# Patient Record
Sex: MALE | Race: BLACK OR AFRICAN AMERICAN | Employment: UNEMPLOYED | URBAN - METROPOLITAN AREA
[De-identification: names, ages, dates, MRNs, and addresses within clinical notes are randomized per-mention and may not be internally consistent; named-entity substitution may affect disease eponyms.]

---

## 2017-05-26 ENCOUNTER — HOSPITAL ENCOUNTER (OUTPATIENT)
Dept: LAB | Age: 47
Discharge: HOME | End: 2017-05-26
Payer: MEDICARE

## 2017-05-26 DIAGNOSIS — E03.9 UNSPECIFIED HYPOTHYROIDISM: ICD-10-CM

## 2017-05-26 DIAGNOSIS — E78.5 OTHER AND UNSPECIFIED HYPERLIPIDEMIA: ICD-10-CM

## 2017-05-26 DIAGNOSIS — G80.9: ICD-10-CM

## 2017-05-26 DIAGNOSIS — B35.3 DERMATOPHYTOSIS OF FOOT: ICD-10-CM

## 2017-05-26 LAB
25(OH)D3 SERPL-MCNC: 22.9 NG/ML (ref 30–100)
ALBUMIN SERPL BCP-MCNC: 4.1 G/DL (ref 3.4–5)
ALBUMIN/GLOB SERPL: 1.2 {RATIO} (ref 0.8–1.7)
ALP SERPL-CCNC: 72 U/L (ref 45–117)
ALT SERPL-CCNC: 10 U/L (ref 16–61)
ANION GAP BLD CALC-SCNC: 8 MMOL/L (ref 3–18)
AST SERPL W P-5'-P-CCNC: 15 U/L (ref 15–37)
BASOPHILS # BLD AUTO: 0 K/UL (ref 0–0.06)
BASOPHILS # BLD: 0 % (ref 0–2)
BILIRUB DIRECT SERPL-MCNC: 0.1 MG/DL (ref 0–0.2)
BILIRUB SERPL-MCNC: 0.3 MG/DL (ref 0.2–1)
BUN SERPL-MCNC: 11 MG/DL (ref 7–18)
BUN/CREAT SERPL: 14 (ref 12–20)
CALCIUM SERPL-MCNC: 9 MG/DL (ref 8.5–10.1)
CHLORIDE SERPL-SCNC: 102 MMOL/L (ref 100–108)
CHOLEST SERPL-MCNC: 223 MG/DL
CO2 SERPL-SCNC: 27 MMOL/L (ref 21–32)
CREAT SERPL-MCNC: 0.79 MG/DL (ref 0.6–1.3)
DIFFERENTIAL METHOD BLD: ABNORMAL
EOSINOPHIL # BLD: 0 K/UL (ref 0–0.4)
EOSINOPHIL NFR BLD: 1 % (ref 0–5)
ERYTHROCYTE [DISTWIDTH] IN BLOOD BY AUTOMATED COUNT: 13.2 % (ref 11.6–14.5)
GLOBULIN SER CALC-MCNC: 3.5 G/DL (ref 2–4)
GLUCOSE SERPL-MCNC: 100 MG/DL (ref 74–99)
HCT VFR BLD AUTO: 46.5 % (ref 36–48)
HDLC SERPL-MCNC: 68 MG/DL (ref 40–60)
HDLC SERPL: 3.3 {RATIO} (ref 0–5)
HGB BLD-MCNC: 15.8 G/DL (ref 13–16)
LDLC SERPL CALC-MCNC: 130.4 MG/DL (ref 0–100)
LIPID PROFILE,FLP: ABNORMAL
LYMPHOCYTES # BLD AUTO: 55 % (ref 21–52)
LYMPHOCYTES # BLD: 1.9 K/UL (ref 0.9–3.6)
MCH RBC QN AUTO: 28.9 PG (ref 24–34)
MCHC RBC AUTO-ENTMCNC: 34 G/DL (ref 31–37)
MCV RBC AUTO: 85.2 FL (ref 74–97)
MONOCYTES # BLD: 0.2 K/UL (ref 0.05–1.2)
MONOCYTES NFR BLD AUTO: 7 % (ref 3–10)
NEUTS SEG # BLD: 1.2 K/UL (ref 1.8–8)
NEUTS SEG NFR BLD AUTO: 37 % (ref 40–73)
PLATELET # BLD AUTO: 105 K/UL (ref 135–420)
PMV BLD AUTO: 11.2 FL (ref 9.2–11.8)
POTASSIUM SERPL-SCNC: 4.2 MMOL/L (ref 3.5–5.5)
PROT SERPL-MCNC: 7.6 G/DL (ref 6.4–8.2)
PSA SERPL-MCNC: 0.4 NG/ML (ref 0–4)
RBC # BLD AUTO: 5.46 M/UL (ref 4.7–5.5)
SODIUM SERPL-SCNC: 137 MMOL/L (ref 136–145)
TRIGL SERPL-MCNC: 123 MG/DL (ref ?–150)
TSH SERPL DL<=0.05 MIU/L-ACNC: 0.66 UIU/ML (ref 0.36–3.74)
VLDLC SERPL CALC-MCNC: 24.6 MG/DL
WBC # BLD AUTO: 3.4 K/UL (ref 4.6–13.2)

## 2017-05-26 PROCEDURE — 36415 COLL VENOUS BLD VENIPUNCTURE: CPT | Performed by: FAMILY MEDICINE

## 2017-05-26 PROCEDURE — 80061 LIPID PANEL: CPT | Performed by: FAMILY MEDICINE

## 2017-05-26 PROCEDURE — 80048 BASIC METABOLIC PNL TOTAL CA: CPT | Performed by: FAMILY MEDICINE

## 2017-05-26 PROCEDURE — 80076 HEPATIC FUNCTION PANEL: CPT | Performed by: FAMILY MEDICINE

## 2017-05-26 PROCEDURE — 82306 VITAMIN D 25 HYDROXY: CPT | Performed by: FAMILY MEDICINE

## 2017-05-26 PROCEDURE — 84153 ASSAY OF PSA TOTAL: CPT | Performed by: FAMILY MEDICINE

## 2017-05-26 PROCEDURE — 85025 COMPLETE CBC W/AUTO DIFF WBC: CPT | Performed by: FAMILY MEDICINE

## 2017-05-26 PROCEDURE — 84443 ASSAY THYROID STIM HORMONE: CPT | Performed by: FAMILY MEDICINE

## 2017-05-31 ENCOUNTER — HOSPITAL ENCOUNTER (OUTPATIENT)
Dept: LAB | Age: 47
Discharge: HOME | End: 2017-05-31
Payer: MEDICARE

## 2017-05-31 LAB
APPEARANCE UR: CLEAR
BILIRUB UR QL: NEGATIVE
COLOR UR: ABNORMAL
GLUCOSE UR STRIP.AUTO-MCNC: NEGATIVE MG/DL
HGB UR QL STRIP: NEGATIVE
KETONES UR QL STRIP.AUTO: ABNORMAL MG/DL
LEUKOCYTE ESTERASE UR QL STRIP.AUTO: NEGATIVE
NITRITE UR QL STRIP.AUTO: NEGATIVE
PH UR STRIP: 6.5 [PH] (ref 5–8)
PROT UR STRIP-MCNC: NEGATIVE MG/DL
SP GR UR REFRACTOMETRY: 1.03 (ref 1–1.03)
UROBILINOGEN UR QL STRIP.AUTO: 1 EU/DL (ref 0.2–1)

## 2017-05-31 PROCEDURE — 81003 URINALYSIS AUTO W/O SCOPE: CPT | Performed by: FAMILY MEDICINE

## 2017-11-14 ENCOUNTER — HOSPITAL ENCOUNTER (OUTPATIENT)
Dept: LAB | Age: 47
Discharge: HOME | End: 2017-11-14
Payer: MEDICARE

## 2017-11-14 DIAGNOSIS — G40.019 LOCALIZATION-RELATED IDIOPATHIC EPILEPSY AND EPILEPTIC SYNDROMES WITH SEIZURES OF LOCALIZED ONSET, INTRACTABLE, WITHOUT STATUS EPILEPTICUS (HCC): ICD-10-CM

## 2017-11-14 LAB
ALBUMIN SERPL-MCNC: 4.1 G/DL (ref 3.4–5)
ALBUMIN/GLOB SERPL: 1.2 {RATIO} (ref 0.8–1.7)
ALP SERPL-CCNC: 66 U/L (ref 45–117)
ALT SERPL-CCNC: 14 U/L (ref 16–61)
APTT PPP: 34 SEC (ref 23–36.4)
AST SERPL-CCNC: 11 U/L (ref 15–37)
BILIRUB DIRECT SERPL-MCNC: <0.1 MG/DL (ref 0–0.2)
BILIRUB SERPL-MCNC: 0.2 MG/DL (ref 0.2–1)
GLOBULIN SER CALC-MCNC: 3.5 G/DL (ref 2–4)
INR PPP: 1 (ref 0.8–1.2)
PROT SERPL-MCNC: 7.6 G/DL (ref 6.4–8.2)
PROTHROMBIN TIME: 12.9 SEC (ref 11.5–15.2)

## 2017-11-14 PROCEDURE — 36415 COLL VENOUS BLD VENIPUNCTURE: CPT | Performed by: PSYCHIATRY & NEUROLOGY

## 2017-11-14 PROCEDURE — 80076 HEPATIC FUNCTION PANEL: CPT | Performed by: PSYCHIATRY & NEUROLOGY

## 2017-11-14 PROCEDURE — 85610 PROTHROMBIN TIME: CPT | Performed by: PSYCHIATRY & NEUROLOGY

## 2017-11-14 PROCEDURE — 85730 THROMBOPLASTIN TIME PARTIAL: CPT | Performed by: PSYCHIATRY & NEUROLOGY

## 2017-11-20 ENCOUNTER — HOSPITAL ENCOUNTER (OUTPATIENT)
Dept: LAB | Age: 47
Discharge: HOME | End: 2017-11-20

## 2017-11-20 DIAGNOSIS — E78.5 HYPERLIPEMIA: ICD-10-CM

## 2017-11-20 DIAGNOSIS — D72.819 LEUCOPENIA: ICD-10-CM

## 2017-11-20 DIAGNOSIS — H91.90 PROBLEMS WITH HEARING: ICD-10-CM

## 2017-11-20 DIAGNOSIS — K06.1 GINGIVAL HYPERPLASIA: ICD-10-CM

## 2017-11-20 DIAGNOSIS — E03.9 MYXEDEMA HEART DISEASE: ICD-10-CM

## 2017-11-20 DIAGNOSIS — F80.9 PROBLEMS WITH COMMUNICATION (INCLUDING SPEECH): ICD-10-CM

## 2017-11-20 DIAGNOSIS — H53.461 RIGHT HOMONYMOUS HEMIANOPSIA: ICD-10-CM

## 2017-11-20 DIAGNOSIS — E79.0 URICACIDEMIA: ICD-10-CM

## 2017-11-20 DIAGNOSIS — I51.9 MYXEDEMA HEART DISEASE: ICD-10-CM

## 2017-11-20 DIAGNOSIS — G83.89 CEREBRAL PARESIS WITH HOMOLATERAL ATAXIA (HCC): ICD-10-CM

## 2017-11-20 DIAGNOSIS — D69.6 ACQUIRED THROMBOCYTOPENIA (HCC): ICD-10-CM

## 2017-11-20 DIAGNOSIS — G40.909 EPILEPSY (HCC): ICD-10-CM

## 2017-11-20 DIAGNOSIS — R26.9 ABNORMALITY OF GAIT: ICD-10-CM

## 2017-11-20 DIAGNOSIS — B35.3 DERMATOPHYTOSIS OF FOOT: ICD-10-CM

## 2017-11-20 DIAGNOSIS — E55.9 AVITAMINOSIS D: ICD-10-CM

## 2018-07-11 ENCOUNTER — APPOINTMENT (OUTPATIENT)
Dept: CT IMAGING | Age: 48
End: 2018-07-11
Attending: STUDENT IN AN ORGANIZED HEALTH CARE EDUCATION/TRAINING PROGRAM
Payer: MEDICARE

## 2018-07-11 ENCOUNTER — HOSPITAL ENCOUNTER (EMERGENCY)
Age: 48
Discharge: HOME | End: 2018-07-11
Attending: EMERGENCY MEDICINE | Admitting: EMERGENCY MEDICINE
Payer: MEDICARE

## 2018-07-11 VITALS
SYSTOLIC BLOOD PRESSURE: 111 MMHG | HEART RATE: 61 BPM | RESPIRATION RATE: 17 BRPM | DIASTOLIC BLOOD PRESSURE: 73 MMHG | BODY MASS INDEX: 29.73 KG/M2 | OXYGEN SATURATION: 97 % | HEIGHT: 66 IN | TEMPERATURE: 97.6 F | WEIGHT: 185 LBS

## 2018-07-11 DIAGNOSIS — E03.9 HYPOTHYROIDISM, UNSPECIFIED TYPE: ICD-10-CM

## 2018-07-11 DIAGNOSIS — G40.909 SEIZURE DISORDER (HCC): Primary | ICD-10-CM

## 2018-07-11 LAB
ANION GAP SERPL CALC-SCNC: 6 MMOL/L (ref 3–18)
APPEARANCE UR: CLEAR
ATRIAL RATE: 66 BPM
BASOPHILS # BLD: 0 K/UL (ref 0–0.1)
BASOPHILS NFR BLD: 0 % (ref 0–2)
BILIRUB UR QL: NEGATIVE
BUN SERPL-MCNC: 18 MG/DL (ref 7–18)
BUN/CREAT SERPL: 20 (ref 12–20)
CALCIUM SERPL-MCNC: 8.6 MG/DL (ref 8.5–10.1)
CALCULATED P AXIS, ECG09: 61 DEGREES
CALCULATED R AXIS, ECG10: 34 DEGREES
CALCULATED T AXIS, ECG11: 24 DEGREES
CHLORIDE SERPL-SCNC: 111 MMOL/L (ref 100–108)
CO2 SERPL-SCNC: 28 MMOL/L (ref 21–32)
COLOR UR: YELLOW
CREAT SERPL-MCNC: 0.9 MG/DL (ref 0.6–1.3)
DIAGNOSIS, 93000: NORMAL
DIFFERENTIAL METHOD BLD: ABNORMAL
EOSINOPHIL # BLD: 0.1 K/UL (ref 0–0.4)
EOSINOPHIL NFR BLD: 1 % (ref 0–5)
ERYTHROCYTE [DISTWIDTH] IN BLOOD BY AUTOMATED COUNT: 13.8 % (ref 11.6–14.5)
GLUCOSE BLD STRIP.AUTO-MCNC: 106 MG/DL (ref 70–110)
GLUCOSE SERPL-MCNC: 103 MG/DL (ref 74–99)
GLUCOSE UR STRIP.AUTO-MCNC: NEGATIVE MG/DL
HCT VFR BLD AUTO: 42.8 % (ref 36–48)
HGB BLD-MCNC: 14.4 G/DL (ref 13–16)
HGB UR QL STRIP: NEGATIVE
KETONES UR QL STRIP.AUTO: ABNORMAL MG/DL
LEUKOCYTE ESTERASE UR QL STRIP.AUTO: NEGATIVE
LYMPHOCYTES # BLD: 2.5 K/UL (ref 0.9–3.6)
LYMPHOCYTES NFR BLD: 59 % (ref 21–52)
MCH RBC QN AUTO: 29.5 PG (ref 24–34)
MCHC RBC AUTO-ENTMCNC: 33.6 G/DL (ref 31–37)
MCV RBC AUTO: 87.7 FL (ref 74–97)
MONOCYTES # BLD: 0.4 K/UL (ref 0.05–1.2)
MONOCYTES NFR BLD: 9 % (ref 3–10)
NEUTS SEG # BLD: 1.3 K/UL (ref 1.8–8)
NEUTS SEG NFR BLD: 31 % (ref 40–73)
NITRITE UR QL STRIP.AUTO: NEGATIVE
P-R INTERVAL, ECG05: 158 MS
PH UR STRIP: 7 [PH] (ref 5–8)
PHENYTOIN SERPL-MCNC: 0.9 UG/ML (ref 10–20)
PLATELET # BLD AUTO: 114 K/UL (ref 135–420)
PMV BLD AUTO: 12.2 FL (ref 9.2–11.8)
POTASSIUM SERPL-SCNC: 4.1 MMOL/L (ref 3.5–5.5)
PROT UR STRIP-MCNC: NEGATIVE MG/DL
Q-T INTERVAL, ECG07: 356 MS
QRS DURATION, ECG06: 90 MS
QTC CALCULATION (BEZET), ECG08: 373 MS
RBC # BLD AUTO: 4.88 M/UL (ref 4.7–5.5)
SODIUM SERPL-SCNC: 145 MMOL/L (ref 136–145)
SP GR UR REFRACTOMETRY: >1.03 (ref 1–1.03)
T4 FREE SERPL-MCNC: 0.6 NG/DL (ref 0.7–1.5)
TSH SERPL DL<=0.05 MIU/L-ACNC: 0.29 UIU/ML (ref 0.36–3.74)
UROBILINOGEN UR QL STRIP.AUTO: 1 EU/DL (ref 0.2–1)
VALPROATE SERPL-MCNC: 84 UG/ML (ref 50–100)
VENTRICULAR RATE, ECG03: 66 BPM
WBC # BLD AUTO: 4.2 K/UL (ref 4.6–13.2)

## 2018-07-11 PROCEDURE — 99285 EMERGENCY DEPT VISIT HI MDM: CPT

## 2018-07-11 PROCEDURE — 85025 COMPLETE CBC W/AUTO DIFF WBC: CPT

## 2018-07-11 PROCEDURE — 81003 URINALYSIS AUTO W/O SCOPE: CPT

## 2018-07-11 PROCEDURE — 84443 ASSAY THYROID STIM HORMONE: CPT

## 2018-07-11 PROCEDURE — 82962 GLUCOSE BLOOD TEST: CPT

## 2018-07-11 PROCEDURE — 80048 BASIC METABOLIC PNL TOTAL CA: CPT

## 2018-07-11 PROCEDURE — 84439 ASSAY OF FREE THYROXINE: CPT

## 2018-07-11 PROCEDURE — 74011000258 HC RX REV CODE- 258: Performed by: STUDENT IN AN ORGANIZED HEALTH CARE EDUCATION/TRAINING PROGRAM

## 2018-07-11 PROCEDURE — 96365 THER/PROPH/DIAG IV INF INIT: CPT

## 2018-07-11 PROCEDURE — 70450 CT HEAD/BRAIN W/O DYE: CPT

## 2018-07-11 PROCEDURE — 80185 ASSAY OF PHENYTOIN TOTAL: CPT

## 2018-07-11 PROCEDURE — 93005 ELECTROCARDIOGRAM TRACING: CPT

## 2018-07-11 PROCEDURE — 74011250636 HC RX REV CODE- 250/636: Performed by: STUDENT IN AN ORGANIZED HEALTH CARE EDUCATION/TRAINING PROGRAM

## 2018-07-11 PROCEDURE — 80164 ASSAY DIPROPYLACETIC ACD TOT: CPT

## 2018-07-11 RX ORDER — NALOXONE HYDROCHLORIDE 0.4 MG/ML
0.4 INJECTION, SOLUTION INTRAMUSCULAR; INTRAVENOUS; SUBCUTANEOUS ONCE
Status: COMPLETED | OUTPATIENT
Start: 2018-07-11 | End: 2018-07-11

## 2018-07-11 RX ADMIN — NALOXONE HYDROCHLORIDE 0.4 MG: 0.4 INJECTION, SOLUTION INTRAMUSCULAR; INTRAVENOUS; SUBCUTANEOUS at 16:42

## 2018-07-11 RX ADMIN — PHENYTOIN SODIUM 600 MG: 50 INJECTION INTRAMUSCULAR; INTRAVENOUS at 19:45

## 2018-07-11 NOTE — ED TRIAGE NOTES
Per EMS, Patient is from a group home where he was sleeping on the couch where he was snoring less than normal. He does have pinpoint pupils.

## 2018-07-12 NOTE — ED NOTES
Pt resting on stretcher with care giver at bedside. Pt appears in a position of comfort with stable vital signs.

## 2018-07-12 NOTE — ED NOTES
I have reviewed discharge instructions with patient. The patient verbalized understanding. Patient armband removed and shredded. No acute distress noted at this time, pt alert and oriented. Stable at time of discharge. Vital signs stable. Caregiver to wheel pt to vehicle. Wheelchair provided.

## 2018-08-14 ENCOUNTER — HOSPITAL ENCOUNTER (OUTPATIENT)
Dept: NON INVASIVE DIAGNOSTICS | Age: 48
Discharge: HOME | End: 2018-08-14
Attending: FAMILY MEDICINE
Payer: MEDICARE

## 2018-08-14 VITALS
BODY MASS INDEX: 29.73 KG/M2 | DIASTOLIC BLOOD PRESSURE: 70 MMHG | HEIGHT: 66 IN | WEIGHT: 185 LBS | SYSTOLIC BLOOD PRESSURE: 110 MMHG

## 2018-08-14 DIAGNOSIS — M79.89 RIGHT LEG SWELLING: ICD-10-CM

## 2018-08-14 DIAGNOSIS — R03.0 ELEVATED BLOOD PRESSURE READING WITHOUT DIAGNOSIS OF HYPERTENSION: ICD-10-CM

## 2018-08-14 DIAGNOSIS — E78.5 HYPERLIPEMIA: ICD-10-CM

## 2018-08-14 DIAGNOSIS — G80.9 CEREBRAL PALSY, UNSPECIFIED TYPE (HCC): ICD-10-CM

## 2018-08-14 DIAGNOSIS — R26.9 GAIT DIFFICULTY: ICD-10-CM

## 2018-08-14 DIAGNOSIS — R06.02 SHORTNESS OF BREATH: ICD-10-CM

## 2018-08-14 DIAGNOSIS — G40.909 SEIZURE DISORDER (HCC): ICD-10-CM

## 2018-08-14 LAB
ECHO AO ROOT DIAM: 3.35 CM
ECHO LA AREA 4C: 18.4 CM2
ECHO LA VOL 2C: 41.68 ML (ref 18–58)
ECHO LA VOL 4C: 39.82 ML (ref 18–58)
ECHO LA VOL BP: 48.87 ML (ref 18–58)
ECHO LA VOL/BSA BIPLANE: 25.26 ML/M2
ECHO LA VOLUME INDEX A2C: 21.54 ML/M2
ECHO LA VOLUME INDEX A4C: 20.58 ML/M2
ECHO LV E' LATERAL VELOCITY: 8.86 CM/S
ECHO LV E' SEPTAL VELOCITY: 6.2 CM/S
ECHO LV INTERNAL DIMENSION DIASTOLIC: 3.77 CM (ref 4.2–5.9)
ECHO LV INTERNAL DIMENSION SYSTOLIC: 2.58 CM
ECHO LV IVSD: 1.06 CM (ref 0.6–1)
ECHO LV MASS 2D: 142.6 G (ref 88–224)
ECHO LV MASS INDEX 2D: 73.7 G/M2
ECHO LV POSTERIOR WALL DIASTOLIC: 1.05 CM (ref 0.6–1)
ECHO LVOT DIAM: 2.04 CM
ECHO MV A VELOCITY: 56.13 CM/S
ECHO MV E DECELERATION TIME (DT): 124.9 MS
ECHO MV E VELOCITY: 0.53 CM/S
ECHO MV E/A RATIO: 0.01
ECHO MV E/E' LATERAL: 0.06
ECHO MV E/E' RATIO (AVERAGED): 0.07
ECHO MV E/E' SEPTAL: 0.09
ECHO PVEIN A DURATION: 76.7 MS
ECHO PVEIN A VELOCITY: 22.99 CM/S
ECHO RV INTERNAL DIMENSION: 3.04 CM
ECHO TV REGURGITANT MAX VELOCITY: 227.65 CM/S
ECHO TV REGURGITANT PEAK GRADIENT: 20.7 MMHG

## 2018-08-14 PROCEDURE — 93306 TTE W/DOPPLER COMPLETE: CPT

## 2018-08-29 ENCOUNTER — HOSPITAL ENCOUNTER (OUTPATIENT)
Dept: VASCULAR SURGERY | Age: 48
Discharge: HOME | End: 2018-08-29
Attending: FAMILY MEDICINE
Payer: MEDICARE

## 2018-08-29 ENCOUNTER — HOSPITAL ENCOUNTER (EMERGENCY)
Age: 48
Discharge: HOME | End: 2018-08-29
Attending: EMERGENCY MEDICINE
Payer: MEDICARE

## 2018-08-29 ENCOUNTER — APPOINTMENT (OUTPATIENT)
Dept: GENERAL RADIOLOGY | Age: 48
End: 2018-08-29
Attending: EMERGENCY MEDICINE
Payer: MEDICARE

## 2018-08-29 VITALS
OXYGEN SATURATION: 97 % | TEMPERATURE: 97.8 F | RESPIRATION RATE: 18 BRPM | DIASTOLIC BLOOD PRESSURE: 72 MMHG | SYSTOLIC BLOOD PRESSURE: 122 MMHG | WEIGHT: 228 LBS | HEART RATE: 74 BPM | BODY MASS INDEX: 36.8 KG/M2

## 2018-08-29 DIAGNOSIS — M79.89 RIGHT LEG SWELLING: ICD-10-CM

## 2018-08-29 DIAGNOSIS — I82.4Y1 DEEP VEIN THROMBOSIS (DVT) OF PROXIMAL VEIN OF RIGHT LOWER EXTREMITY, UNSPECIFIED CHRONICITY (HCC): Primary | ICD-10-CM

## 2018-08-29 LAB
ALBUMIN SERPL-MCNC: 3.7 G/DL (ref 3.4–5)
ALBUMIN/GLOB SERPL: 0.9 {RATIO} (ref 0.8–1.7)
ALP SERPL-CCNC: 69 U/L (ref 45–117)
ALT SERPL-CCNC: 11 U/L (ref 16–61)
ANION GAP SERPL CALC-SCNC: 7 MMOL/L (ref 3–18)
APTT PPP: 28.2 SEC (ref 23–36.4)
AST SERPL-CCNC: 12 U/L (ref 15–37)
BASOPHILS # BLD: 0 K/UL (ref 0–0.1)
BASOPHILS NFR BLD: 0 % (ref 0–2)
BILIRUB SERPL-MCNC: 0.1 MG/DL (ref 0.2–1)
BNP SERPL-MCNC: 22 PG/ML (ref 0–450)
BUN SERPL-MCNC: 15 MG/DL (ref 7–18)
BUN/CREAT SERPL: 14 (ref 12–20)
CALCIUM SERPL-MCNC: 8.5 MG/DL (ref 8.5–10.1)
CHLORIDE SERPL-SCNC: 107 MMOL/L (ref 100–108)
CK MB CFR SERPL CALC: NORMAL % (ref 0–4)
CK MB SERPL-MCNC: <1 NG/ML (ref 5–25)
CK SERPL-CCNC: 84 U/L (ref 39–308)
CO2 SERPL-SCNC: 28 MMOL/L (ref 21–32)
CREAT SERPL-MCNC: 1.08 MG/DL (ref 0.6–1.3)
DIFFERENTIAL METHOD BLD: ABNORMAL
EOSINOPHIL # BLD: 0 K/UL (ref 0–0.4)
EOSINOPHIL NFR BLD: 0 % (ref 0–5)
ERYTHROCYTE [DISTWIDTH] IN BLOOD BY AUTOMATED COUNT: 13.1 % (ref 11.6–14.5)
GLOBULIN SER CALC-MCNC: 4.1 G/DL (ref 2–4)
GLUCOSE SERPL-MCNC: 97 MG/DL (ref 74–99)
HCT VFR BLD AUTO: 44.1 % (ref 36–48)
HGB BLD-MCNC: 14.8 G/DL (ref 13–16)
INR PPP: 1 (ref 0.8–1.2)
LYMPHOCYTES # BLD: 2 K/UL (ref 0.9–3.6)
LYMPHOCYTES NFR BLD: 49 % (ref 21–52)
MCH RBC QN AUTO: 28.5 PG (ref 24–34)
MCHC RBC AUTO-ENTMCNC: 33.6 G/DL (ref 31–37)
MCV RBC AUTO: 85 FL (ref 74–97)
MONOCYTES # BLD: 0.3 K/UL (ref 0.05–1.2)
MONOCYTES NFR BLD: 8 % (ref 3–10)
NEUTS SEG # BLD: 1.7 K/UL (ref 1.8–8)
NEUTS SEG NFR BLD: 43 % (ref 40–73)
PLATELET # BLD AUTO: 121 K/UL (ref 135–420)
PMV BLD AUTO: 12.2 FL (ref 9.2–11.8)
POTASSIUM SERPL-SCNC: 4 MMOL/L (ref 3.5–5.5)
PROT SERPL-MCNC: 7.8 G/DL (ref 6.4–8.2)
PROTHROMBIN TIME: 13.3 SEC (ref 11.5–15.2)
RBC # BLD AUTO: 5.19 M/UL (ref 4.7–5.5)
SODIUM SERPL-SCNC: 142 MMOL/L (ref 136–145)
TROPONIN I SERPL-MCNC: <0.02 NG/ML (ref 0–0.04)
WBC # BLD AUTO: 4 K/UL (ref 4.6–13.2)

## 2018-08-29 PROCEDURE — 82550 ASSAY OF CK (CPK): CPT | Performed by: EMERGENCY MEDICINE

## 2018-08-29 PROCEDURE — 85025 COMPLETE CBC W/AUTO DIFF WBC: CPT | Performed by: EMERGENCY MEDICINE

## 2018-08-29 PROCEDURE — 99285 EMERGENCY DEPT VISIT HI MDM: CPT

## 2018-08-29 PROCEDURE — 71045 X-RAY EXAM CHEST 1 VIEW: CPT

## 2018-08-29 PROCEDURE — 83880 ASSAY OF NATRIURETIC PEPTIDE: CPT | Performed by: EMERGENCY MEDICINE

## 2018-08-29 PROCEDURE — 85730 THROMBOPLASTIN TIME PARTIAL: CPT | Performed by: EMERGENCY MEDICINE

## 2018-08-29 PROCEDURE — 96372 THER/PROPH/DIAG INJ SC/IM: CPT

## 2018-08-29 PROCEDURE — 93005 ELECTROCARDIOGRAM TRACING: CPT

## 2018-08-29 PROCEDURE — 74011250636 HC RX REV CODE- 250/636: Performed by: EMERGENCY MEDICINE

## 2018-08-29 PROCEDURE — 85610 PROTHROMBIN TIME: CPT | Performed by: EMERGENCY MEDICINE

## 2018-08-29 PROCEDURE — 80053 COMPREHEN METABOLIC PANEL: CPT | Performed by: EMERGENCY MEDICINE

## 2018-08-29 PROCEDURE — 93971 EXTREMITY STUDY: CPT

## 2018-08-29 RX ORDER — ENOXAPARIN SODIUM 100 MG/ML
100 INJECTION SUBCUTANEOUS
Status: COMPLETED | OUTPATIENT
Start: 2018-08-29 | End: 2018-08-29

## 2018-08-29 RX ADMIN — ENOXAPARIN SODIUM 100 MG: 100 INJECTION SUBCUTANEOUS at 17:01

## 2018-08-29 NOTE — ED PROVIDER NOTES
EMERGENCY DEPARTMENT HISTORY AND PHYSICAL EXAM 
 
2:08 PM 
 
 
Date: 8/29/2018 Patient Name: Henry Garcia History of Presenting Illness Chief Complaint Patient presents with  Abnormal Lab Results History Provided By: Caregiver Chief Complaint: leg swelling Duration: 1 Days Timing:  Acute Location: right Quality: n/a Severity: mild Modifying Factors: vascular scan showed diffuse right sided DVT Associated Symptoms: denies any other associated signs or symptoms Additional History (Context): Henry Garcia is a 50 y.o. male with seizure and cerebral palsy who presents with 1 day of acute onset right leg swelling and vascular scan showed diffuse right sided DVT. Pt was at vascular lab for routine DVT scan which showed diffuse DVT and Dr Irina Vincent sent pt to the ED. Pt is non-verbal due to cerebral palsy. Per caregiver pt is acting normally and showing no signs of pain or discomfort, pt is normally active and only in bed at night. Pt has baseline right arm contracture and right leg weakness. No other concerns or symptoms at this time. PCP: Joao Bautista MD 
 
Current Outpatient Prescriptions Medication Sig Dispense Refill  rivaroxaban (XARELTO) 15 mg (42)- 20 mg (9) DsPk Take one 15 mg tablet twice a day with food for the first 21 days. Then, take one 20 mg tablet once a day with food for 9 days. Indications: deep venous thrombosis 1 Dose Pack 0  
 zonisamide (ZONEGRAN) 100 mg capsule Take  by mouth daily. 3 tabs daily through 11/30 then 4 tabs daily  DIVALPROEX SODIUM (DEPAKOTE PO) Take 1,000 mg PE by mouth three (3) times daily. 8am, 2pm, and 8pm    
 phenytoin ER (DILANTIN) 30 mg ER capsule Take 130 mg by mouth daily.  levothyroxine (LEVOXYL) 75 mcg tablet Take 75 mcg by mouth Daily (before breakfast).  Calcium-Cholecalciferol, D3, 500 mg(1,250mg) -400 unit tab Take 1 Tab by mouth daily.  multivitamin (ONE A DAY) tablet Take 1 Tab by mouth daily.  polyethylene glycol (MIRALAX) 17 gram packet Take 17 g by mouth every Monday, Wednesday, Friday.  docusate sodium (COLACE) 100 mg capsule Take 100 mg by mouth two (2) times a day.  clotrimazole-betamethasone (LOTRISONE) topical cream Apply  to affected area two (2) times a day. Apply between toes  chlorhexidine (PERIDEX) 0.12 % solution 15 mL by Swish and Spit route two (2) times a day.  tolnaftate (TINACTIN) 1 % topical powder Apply  to affected area two (2) times a day. Past History Past Medical History: 
Past Medical History:  
Diagnosis Date  Endocrine disease   
 hypothyroidism  Neurological disorder   
 cerebral palsy  Seizures ([facility] [address] Past Surgical History: 
Past Surgical History:  
Procedure Laterality Date  HX ORTHOPAEDIC    
 right heel Family History: No family history on file. Social History: 
Social History Substance Use Topics  Smoking status: Never Smoker  Smokeless tobacco: Never Used  Alcohol use No  
 
 
Allergies: Allergies Allergen Reactions  Amoxicillin Swelling  Cephalosporins Unable to Obtain  Penicillin G Swelling Review of Systems Review of Systems Respiratory: Negative for shortness of breath. Cardiovascular: Positive for leg swelling (right). Negative for chest pain. Gastrointestinal: Negative for abdominal pain. All other systems reviewed and are negative. Physical Exam  
 
Patient Vitals for the past 12 hrs: 
 Temp Pulse Resp BP SpO2  
08/29/18 1630 - 74 18 122/72 -  
08/29/18 1615 - 79 19 115/70 -  
08/29/18 1600 - 75 19 113/71 97 % 08/29/18 1545 - 79 20 122/79 -  
08/29/18 1530 - 81 16 122/77 -  
08/29/18 1515 - 78 19 110/64 97 % 08/29/18 1500 - 82 18 132/78 98 % 08/29/18 1445 - 84 21 121/71 93 % 08/29/18 1430 - 86 18 115/70 98 % 08/29/18 1415 - 92 22 138/88 92 % 08/29/18 1406 97.8 °F (36.6 °C) 91 22 152/85 96 % Physical Exam  
Constitutional: He is oriented to person, place, and time. He appears well-developed. HENT:  
Head: Atraumatic. cerebral palsy Eyes: Conjunctivae and EOM are normal.  
Neck: Normal range of motion. Cardiovascular: Normal heart sounds. Exam reveals no gallop and no friction rub. No murmur heard. Pulmonary/Chest: Effort normal and breath sounds normal. No stridor. Abdominal: Soft. There is no tenderness. Musculoskeletal: Normal range of motion. He exhibits no tenderness. Right leg minimally swollen compared to left No tenderness to chest or extremities. Right arm contracted Neurological: He is alert and oriented to person, place, and time. Skin: Skin is warm and dry. He is not diaphoretic. Psychiatric: He has a normal mood and affect. His behavior is normal.  
Nursing note and vitals reviewed. Diagnostic Study Results Labs - Recent Results (from the past 12 hour(s)) CARDIAC PANEL,(CK, CKMB & TROPONIN) Collection Time: 08/29/18  2:18 PM  
Result Value Ref Range CK 84 39 - 308 U/L  
 CK - MB <1.0 <3.6 ng/ml CK-MB Index  0.0 - 4.0 % CALCULATION NOT PERFORMED WHEN RESULT IS BELOW LINEAR LIMIT Troponin-I, Qt. <0.02 0.0 - 0.045 NG/ML  
CBC WITH AUTOMATED DIFF Collection Time: 08/29/18  2:18 PM  
Result Value Ref Range WBC 4.0 (L) 4.6 - 13.2 K/uL  
 RBC 5.19 4.70 - 5.50 M/uL  
 HGB 14.8 13.0 - 16.0 g/dL HCT 44.1 36.0 - 48.0 % MCV 85.0 74.0 - 97.0 FL  
 MCH 28.5 24.0 - 34.0 PG  
 MCHC 33.6 31.0 - 37.0 g/dL  
 RDW 13.1 11.6 - 14.5 % PLATELET 986 (L) 380 - 420 K/uL MPV 12.2 (H) 9.2 - 11.8 FL  
 NEUTROPHILS 43 40 - 73 % LYMPHOCYTES 49 21 - 52 % MONOCYTES 8 3 - 10 % EOSINOPHILS 0 0 - 5 % BASOPHILS 0 0 - 2 %  
 ABS. NEUTROPHILS 1.7 (L) 1.8 - 8.0 K/UL  
 ABS. LYMPHOCYTES 2.0 0.9 - 3.6 K/UL  
 ABS. MONOCYTES 0.3 0.05 - 1.2 K/UL  
 ABS. EOSINOPHILS 0.0 0.0 - 0.4 K/UL ABS. BASOPHILS 0.0 0.0 - 0.1 K/UL  
 DF AUTOMATED METABOLIC PANEL, COMPREHENSIVE Collection Time: 08/29/18  2:18 PM  
Result Value Ref Range Sodium 142 136 - 145 mmol/L Potassium 4.0 3.5 - 5.5 mmol/L Chloride 107 100 - 108 mmol/L  
 CO2 28 21 - 32 mmol/L Anion gap 7 3.0 - 18 mmol/L Glucose 97 74 - 99 mg/dL BUN 15 7.0 - 18 MG/DL Creatinine 1.08 0.6 - 1.3 MG/DL  
 BUN/Creatinine ratio 14 12 - 20 GFR est AA >60 >60 ml/min/1.73m2 GFR est non-AA >60 >60 ml/min/1.73m2 Calcium 8.5 8.5 - 10.1 MG/DL Bilirubin, total 0.1 (L) 0.2 - 1.0 MG/DL  
 ALT (SGPT) 11 (L) 16 - 61 U/L  
 AST (SGOT) 12 (L) 15 - 37 U/L Alk. phosphatase 69 45 - 117 U/L Protein, total 7.8 6.4 - 8.2 g/dL Albumin 3.7 3.4 - 5.0 g/dL Globulin 4.1 (H) 2.0 - 4.0 g/dL A-G Ratio 0.9 0.8 - 1.7 NT-PRO BNP Collection Time: 08/29/18  2:18 PM  
Result Value Ref Range NT pro-BNP 22 0 - 450 PG/ML  
PROTHROMBIN TIME + INR Collection Time: 08/29/18  2:18 PM  
Result Value Ref Range Prothrombin time 13.3 11.5 - 15.2 sec INR 1.0 0.8 - 1.2 PTT Collection Time: 08/29/18  2:18 PM  
Result Value Ref Range aPTT 28.2 23.0 - 36.4 SEC Radiologic Studies -  
XR CHEST PORT Final Result IMPRESSION:  
1. Mildly enlarged cardiac silhouette with suspected mild perihilar vascular  
congestion versus infiltrate. Low lung volumes may exaggerate these findings. No  
consolidation. Medical Decision Making Provider Notes (Medical Decision Making): Based on my history, physical exam, and diagnostic evaluation, the patient appears to have symptoms consistent with an venous thrombosis of the right leg. The pt came to the ED with pain and swelling of the right lower extremity over the past several weeks. The patient has not had fever. On exam, the pt has unilateral swelling of the right lower extremity.  There is no redness or warmth. There are no lesions. The distal dorsal pedal pulse is strong. There is no evidence of compartment syndrome. Doppler ultrasound show DVT located throughout the right venous system. The patient was treated with lovenox in the ED and he has felt comfortable and asymptomatic. He has no chest pain, and he has reassuring EKG, negative cardiac enzymes and BNP so if he did have a PE there is no right heart strain. I have spoken with vascular surgery on call and he is agreeable with the plan so far and recommends dc with xarelto. I have relayed the plan back to Dr. Derrick Olmedo who suggested I speak with the hospitalist. They spoke directly together and the plan now will be for the pt to be discharged back to his group home for close PMD follow up. I spoke with the pt's mother Jenny Brooks at [phone] and she was agreeable with the plan. I have also spoken with the group home rep and they understand to fill out the xarelto rx with the coupon immediately. The pt tolerated his lovenox dose here well. He is in stable condition to be discharged. I am the first provider for this patient. I reviewed the vital signs, available nursing notes, past medical history, past surgical history, family history and social history. Vital Signs-Reviewed the patient's vital signs. Pulse Oximetry Analysis -  96% on room air (Interpretation) Cardiac Monitor: 
Rate: 88 Rhythm:  Normal Sinus Rhythm EKG: Interpreted by the EP. Time Interpreted: 14:20 Rate: 87 Rhythm: Normal Sinus Rhythm Interpretation: non-specific ST changes,  Comparison:  
 
Records Reviewed: Nursing Notes and Old Medical Records (Time of Review: 2:08 PM) ED Course: Progress Notes, Reevaluation, and Consults:  
Had extensive conversation with Dr Derrick Olmedo, she sent pt here for concern for DVT because he lives in a group home and is concerned about the level of care and doubts that he will be able to care for himself initially after new diagnosis of DVT. Pt was brought to ED from vascular lab due to diffuse right sided DVT. Consult:  Discussed care with Dr Johny Madsen, Specialty: Vascular Surgeon Standard discussion; including history of patients chief complaint, available diagnostic results, and treatment course. He recommends based on lab work up that pt can be discharged home on Xarelto or Eliquis but he will be happy to follow pt is PCP still feels that she cannot manage the pt as outpatient. 3:33 PM, 8/29/2018 Consult:  Discussed care with Dr Aggie Steele, Specialty: [facility]  Standard discussion; including history of patients chief complaint, available diagnostic results, and treatment course. Updated her on my conversation with vascular 3:36 PM, 8/29/2018 Consult:  Discussed care with Dr Nicole Aragon, Specialty: Hospitalist Standard discussion; including history of patients chief complaint, available diagnostic results, and treatment course. He talked with PCP and she is agreeable for pt to be discharged. 3:40 PM, 8/29/2018 4:13 PM Spoke with Moris Painter, pt's mother and explained the diagnosis so far in discussion with PCP, Vascular Surgeon, Hospitalist and she is agreeable with plan for Lovenox and trial for outpatient management with Xarelto and follow up with Dr Johny Madsen and she understood we have a low threshold for return for worsening symptoms and gave her our direct number for any questions. Diagnosis Clinical Impression: 1. Deep vein thrombosis (DVT) of proximal vein of right lower extremity, unspecified chronicity ([address] Disposition: home Follow-up Information Follow up With Details Comments Contact Info Francisco Garcia MD Go in 2 days For follow up [address] [facility] [address] [id number] [phone] [facility] EMERGENCY DEPT Go to As needed, If symptoms worsen [address] [place] [address] [address] Sesar Jones MD Go in 2 days For follow up with vascular surgery [address] [facility] 
[phone] Discharge Medication List as of 8/29/2018  4:15 PM  
  
START taking these medications Details  
rivaroxaban (XARELTO) 15 mg (42)- 20 mg (9) DsPk Take one 15 mg tablet twice a day with food for the first 21 days. Then, take one 20 mg tablet once a day with food for 9 days. Indications: deep venous thrombosis, Print, Disp-1 Dose Pack, R-0  
  
  
CONTINUE these medications which have NOT CHANGED Details  
zonisamide (ZONEGRAN) 100 mg capsule Take  by mouth daily. 3 tabs daily through 11/30 then 4 tabs daily, Historical Med DIVALPROEX SODIUM (DEPAKOTE PO) Take 1,000 mg PE by mouth three (3) times daily. 8am, 2pm, and 8pm, Historical Med  
  
phenytoin ER (DILANTIN) 30 mg ER capsule Take 130 mg by mouth daily. , Historical Med  
  
levothyroxine (LEVOXYL) 75 mcg tablet Take 75 mcg by mouth Daily (before breakfast). , Historical Med  
  
Calcium-Cholecalciferol, D3, 500 mg(1,250mg) -400 unit tab Take 1 Tab by mouth daily. , Historical Med  
  
multivitamin (ONE A DAY) tablet Take 1 Tab by mouth daily. , Historical Med  
  
polyethylene glycol (MIRALAX) 17 gram packet Take 17 g by mouth every Monday, Wednesday, Friday., Historical Med  
  
docusate sodium (COLACE) 100 mg capsule Take 100 mg by mouth two (2) times a day., Historical Med  
  
clotrimazole-betamethasone (LOTRISONE) topical cream Apply  to affected area two (2) times a day. Apply between toes, Historical Med  
  
chlorhexidine (PERIDEX) 0.12 % solution 15 mL by Swish and Spit route two (2) times a day., Historical Med  
  
tolnaftate (TINACTIN) 1 % topical powder Apply  to affected area two (2) times a day., Historical Med  
  
  
 
_______________________________ Attestations: 
Scribe Attestation Lisa Carpenter acting as a scribe for and in the presence of Kwaku Jovel MD     
August 29, 2018 at 2:08 PM 
 Provider Attestation:     
I personally performed the services described in the documentation, reviewed the documentation, as recorded by the scribe in my presence, and it accurately and completely records my words and actions. August 29, 2018 at 2:08 PM - Jaclyn Downs MD   
_______________________________

## 2018-08-29 NOTE — Clinical Note
Your evaluation today showed diffuse blood clots in your right leg. Please follow up with a doctor as discussed. The evaluation and treatment done today requires that you follow up with a physician for re-evaluation. Medical problems can change over time  and symptoms can get worse or new symptoms can develop over time, therefore, it is important that you follow up as we discussed. Please immediately return to the ER if you have any concerns. Call the ER if you have any questions about what we discussed. At the [facility]\"" Emergency Department we are genuinely concerned about your health and comfort. You may be selected to participate in a patient satisfaction survey mailed to your home. We are excited about the opportunity to learn from  your experience so we may continue to improve. In striving for the very best we believe good is not good enough, but if you rate us as EXCELLENT in all boxes, we have succeeded. We strive to provide EXCELLENT care to you and your family. We appreciate t he opportunity to take care of you, and hope you do well.

## 2018-08-29 NOTE — DISCHARGE INSTRUCTIONS
Deep Vein Thrombosis: After Your Visit to the Emergency Room  Your Care Instructions  A deep vein thrombosis (DVT) is a blood clot in certain veins of the legs, pelvis, or arms. The main goal of treatment is to prevent the blood clot from growing or moving to the lungs. A blood clot in a lung can be life-threatening. The doctor may have given you a blood thinner (anticoagulant). A blood thinner can stop the blood clot from growing larger and prevent new clots from forming. You will need to take a blood thinner for 3 to 6 months or longer. Even though you have been released from the emergency room, you still need to watch for any problems. The doctor carefully checked you. But sometimes problems can develop later. If you have new symptoms, or if your symptoms do not get better, return to the emergency room or call your doctor right away. If you have sudden chest pain and shortness of breath, or you cough up blood, call 911 or seek other emergency help right away. A visit to the emergency room is only one step in your treatment. Even if you feel better, you still need to do what your doctor recommends, such as going to all suggested follow-up appointments and taking medicines exactly as directed. This will help you recover and help prevent future problems. How can you care for yourself at home? · Take your medicines exactly as prescribed. Call your doctor if you think you are having a problem with your medicine. · Follow your doctor's instructions about having blood tests. You may need blood tests--maybe every day at first--to see how well the blood thinner is working. · Wear compression stockings if your doctor prescribes them. These stockings are tighter at the feet than on the legs. They can reduce pain and swelling and may keep blood from pooling in your legs. You can get them with a prescription at a medical supply store or at some drugstores.   · When you sit, use a pillow to raise the arm or leg that has the blood clot. Try to keep it above the level of your heart. · Use a heating pad over the area for 20 to 30 minutes, 3 or 4 times a day, to reduce pain and swelling. · Wear medical alert jewelry that shows that you are on a blood-thinning medicine. You can buy this at most drugstores. When should you call for help? Call 911 if:  · You passed out (lost consciousness). · You have sudden chest pain and shortness of breath, or you cough up blood. · You vomit blood or what looks like coffee grounds. · You pass maroon or very bloody stools. Return to the emergency room now if:  · Your leg or thigh pain is getting worse. · You have signs of another blood clot, such as:  ¨ New pain in your calf, back of the knee, thigh, or groin. ¨ New redness and swelling in your leg or groin. Call your doctor today if:  · You have new bruises or blood spots under your skin. · You have a nosebleed. · Your stools are black and tarlike or have streaks of blood. · You have blood in your urine. · Your gums bleed when you brush your teeth. Where can you learn more? Go to SMGB[employer].be  Enter B611 in the search box to learn more about \"Deep Vein Thrombosis: After Your Visit to the Emergency Room. \"   © 6549-1294 [employer]. Care instructions adapted under license by [employer] (which disclaims liability or warranty for this information). This care instruction is for use with your licensed healthcare professional. If you have questions about a medical condition or this instruction, always ask your healthcare professional. [place] [address] any warranty or liability for your use of this information. Content Version: 9.4.54128;  Last Revised: September 30, 2010

## 2018-08-29 NOTE — ED TRIAGE NOTES
Patient arrived from vascular lab c/o positive blood clots right leg. Patient non-verbal w/ CP. Patient smiles and can occasionally follow commands

## 2018-08-30 LAB
ATRIAL RATE: 87 BPM
CALCULATED P AXIS, ECG09: 54 DEGREES
CALCULATED R AXIS, ECG10: 32 DEGREES
CALCULATED T AXIS, ECG11: 35 DEGREES
DIAGNOSIS, 93000: NORMAL
P-R INTERVAL, ECG05: 154 MS
Q-T INTERVAL, ECG07: 332 MS
QRS DURATION, ECG06: 80 MS
QTC CALCULATION (BEZET), ECG08: 399 MS
VENTRICULAR RATE, ECG03: 87 BPM

## 2018-09-13 ENCOUNTER — HOSPITAL ENCOUNTER (EMERGENCY)
Age: 48
Discharge: HOME | End: 2018-09-13
Attending: EMERGENCY MEDICINE
Payer: MEDICARE

## 2018-09-13 VITALS
OXYGEN SATURATION: 97 % | SYSTOLIC BLOOD PRESSURE: 148 MMHG | DIASTOLIC BLOOD PRESSURE: 92 MMHG | RESPIRATION RATE: 18 BRPM | TEMPERATURE: 98.4 F | HEART RATE: 90 BPM

## 2018-09-13 DIAGNOSIS — T45.511A ANTICOAGULANT OVERDOSAGE, ACCIDENTAL OR UNINTENTIONAL, INITIAL ENCOUNTER: Primary | ICD-10-CM

## 2018-09-13 LAB
ALBUMIN SERPL-MCNC: 3.7 G/DL (ref 3.4–5)
ALBUMIN/GLOB SERPL: 0.9 {RATIO} (ref 0.8–1.7)
ALP SERPL-CCNC: 66 U/L (ref 45–117)
ALT SERPL-CCNC: 11 U/L (ref 16–61)
ANION GAP SERPL CALC-SCNC: 9 MMOL/L (ref 3–18)
APPEARANCE UR: CLEAR
APTT PPP: 31.7 SEC (ref 23–36.4)
AST SERPL-CCNC: 13 U/L (ref 15–37)
BACTERIA URNS QL MICRO: NEGATIVE /HPF
BASOPHILS # BLD: 0 K/UL (ref 0–0.1)
BASOPHILS NFR BLD: 0 % (ref 0–2)
BILIRUB SERPL-MCNC: 0.2 MG/DL (ref 0.2–1)
BILIRUB UR QL: NEGATIVE
BUN SERPL-MCNC: 19 MG/DL (ref 7–18)
BUN/CREAT SERPL: 20 (ref 12–20)
CALCIUM SERPL-MCNC: 8.3 MG/DL (ref 8.5–10.1)
CHLORIDE SERPL-SCNC: 109 MMOL/L (ref 100–108)
CO2 SERPL-SCNC: 24 MMOL/L (ref 21–32)
COLOR UR: ABNORMAL
CREAT SERPL-MCNC: 0.97 MG/DL (ref 0.6–1.3)
DIFFERENTIAL METHOD BLD: ABNORMAL
EOSINOPHIL # BLD: 0 K/UL (ref 0–0.4)
EOSINOPHIL NFR BLD: 1 % (ref 0–5)
EPITH CASTS URNS QL MICRO: NEGATIVE /LPF (ref 0–5)
ERYTHROCYTE [DISTWIDTH] IN BLOOD BY AUTOMATED COUNT: 13.3 % (ref 11.6–14.5)
GLOBULIN SER CALC-MCNC: 3.9 G/DL (ref 2–4)
GLUCOSE SERPL-MCNC: 114 MG/DL (ref 74–99)
GLUCOSE UR STRIP.AUTO-MCNC: NEGATIVE MG/DL
HCT VFR BLD AUTO: 44.2 % (ref 36–48)
HGB BLD-MCNC: 14.7 G/DL (ref 13–16)
HGB UR QL STRIP: NEGATIVE
INR PPP: 1.1 (ref 0.8–1.2)
KETONES UR QL STRIP.AUTO: ABNORMAL MG/DL
LEUKOCYTE ESTERASE UR QL STRIP.AUTO: ABNORMAL
LYMPHOCYTES # BLD: 1.8 K/UL (ref 0.9–3.6)
LYMPHOCYTES NFR BLD: 33 % (ref 21–52)
MCH RBC QN AUTO: 28.8 PG (ref 24–34)
MCHC RBC AUTO-ENTMCNC: 33.3 G/DL (ref 31–37)
MCV RBC AUTO: 86.7 FL (ref 74–97)
MONOCYTES # BLD: 0.4 K/UL (ref 0.05–1.2)
MONOCYTES NFR BLD: 8 % (ref 3–10)
NEUTS SEG # BLD: 3.1 K/UL (ref 1.8–8)
NEUTS SEG NFR BLD: 58 % (ref 40–73)
NITRITE UR QL STRIP.AUTO: NEGATIVE
PH UR STRIP: 7.5 [PH] (ref 5–8)
PLATELET # BLD AUTO: 105 K/UL (ref 135–420)
PMV BLD AUTO: 12.2 FL (ref 9.2–11.8)
POTASSIUM SERPL-SCNC: 3.7 MMOL/L (ref 3.5–5.5)
PROT SERPL-MCNC: 7.6 G/DL (ref 6.4–8.2)
PROT UR STRIP-MCNC: NEGATIVE MG/DL
PROTHROMBIN TIME: 14.3 SEC (ref 11.5–15.2)
RBC # BLD AUTO: 5.1 M/UL (ref 4.7–5.5)
RBC #/AREA URNS HPF: NEGATIVE /HPF (ref 0–5)
SODIUM SERPL-SCNC: 142 MMOL/L (ref 136–145)
SP GR UR REFRACTOMETRY: >1.03 (ref 1–1.03)
UROBILINOGEN UR QL STRIP.AUTO: 1 EU/DL (ref 0.2–1)
WBC # BLD AUTO: 5.3 K/UL (ref 4.6–13.2)
WBC URNS QL MICRO: NORMAL /HPF (ref 0–4)

## 2018-09-13 PROCEDURE — 81001 URINALYSIS AUTO W/SCOPE: CPT | Performed by: PHYSICIAN ASSISTANT

## 2018-09-13 PROCEDURE — 80053 COMPREHEN METABOLIC PANEL: CPT | Performed by: PHYSICIAN ASSISTANT

## 2018-09-13 PROCEDURE — 85730 THROMBOPLASTIN TIME PARTIAL: CPT | Performed by: PHYSICIAN ASSISTANT

## 2018-09-13 PROCEDURE — 85025 COMPLETE CBC W/AUTO DIFF WBC: CPT | Performed by: PHYSICIAN ASSISTANT

## 2018-09-13 PROCEDURE — 99283 EMERGENCY DEPT VISIT LOW MDM: CPT

## 2018-09-13 PROCEDURE — 85610 PROTHROMBIN TIME: CPT | Performed by: PHYSICIAN ASSISTANT

## 2018-09-13 NOTE — ED TRIAGE NOTES
Pt resident from [facility] and services with staff whom states pt  Is non-verbal at baseline. Per staff pt has been urinating blood which was observed occurring today. Staff also states pt currently on Xerolto for blood clot to right leg

## 2018-09-13 NOTE — ED NOTES
I have reviewed discharge instructions with the caregiver. The caregiver verbalized understanding. Discharge medications reviewed with caregiver and appropriate educational materials and side effects teaching were provided. Patient armband removed and shredded

## 2018-09-13 NOTE — ED PROVIDER NOTES
EMERGENCY DEPARTMENT HISTORY AND PHYSICAL EXAM 
 
12:12 PM 
 
 
Date: 9/13/2018 Patient Name: Selina Andrade History of Presenting Illness Chief Complaint Patient presents with  Blood in Urine History Provided By: caregiver Chief Complaint: hematuria Duration:  1 day Additional History (Context): Selina Andrade is a 50 y.o. male with DVT, seizure and CP and hypothyroidism  who presents with hematuria. Started today. Visible in urine, noticed by group home. He was started on xarelto 2 weeks ago in ED for DVT. He was recently started on Rivaroxaban (xarelto) by group home physician, and the previous xarelto dose was not discontinued, so it appears that for at least the past 4-5 days he has received a double dose of xarelto. He is nonverbal at baseline and unable to confirm or deny pain, but is smiling and does not appear to be in any distress. PCP: Teresita Garnett MD 
 
Current Outpatient Prescriptions Medication Sig Dispense Refill  zonisamide (ZONEGRAN) 100 mg capsule Take  by mouth daily. 3 tabs daily through 11/30 then 4 tabs daily  DIVALPROEX SODIUM (DEPAKOTE PO) Take 1,000 mg PE by mouth three (3) times daily. 8am, 2pm, and 8pm    
 phenytoin ER (DILANTIN) 30 mg ER capsule Take 130 mg by mouth daily.  levothyroxine (LEVOXYL) 75 mcg tablet Take 75 mcg by mouth Daily (before breakfast).  Calcium-Cholecalciferol, D3, 500 mg(1,250mg) -400 unit tab Take 1 Tab by mouth daily.  multivitamin (ONE A DAY) tablet Take 1 Tab by mouth daily.  polyethylene glycol (MIRALAX) 17 gram packet Take 17 g by mouth every Monday, Wednesday, Friday.  docusate sodium (COLACE) 100 mg capsule Take 100 mg by mouth two (2) times a day.  clotrimazole-betamethasone (LOTRISONE) topical cream Apply  to affected area two (2) times a day. Apply between toes  chlorhexidine (PERIDEX) 0.12 % solution 15 mL by Swish and Spit route two (2) times a day.  tolnaftate (TINACTIN) 1 % topical powder Apply  to affected area two (2) times a day. Past History Past Medical History: 
Past Medical History:  
Diagnosis Date  Endocrine disease   
 hypothyroidism  Neurological disorder   
 cerebral palsy  Seizures ([address] Past Surgical History: 
Past Surgical History:  
Procedure Laterality Date  HX ORTHOPAEDIC    
 right heel Family History: 
History reviewed. No pertinent family history. Social History: 
Social History Substance Use Topics  Smoking status: Never Smoker  Smokeless tobacco: Never Used  Alcohol use No  
 
 
Allergies: Allergies Allergen Reactions  Amoxicillin Swelling  Cephalosporins Unable to Obtain  Penicillin G Swelling Review of Systems Review of Systems Unable to perform ROS: Patient nonverbal  
 
 
 
Physical Exam  
 
Visit Vitals  BP (!) 148/92 (BP 1 Location: Left arm, BP Patient Position: Sitting)  Pulse 90  Temp 98.4 °F (36.9 °C)  Resp 18  SpO2 97% Physical Exam  
Constitutional: He appears well-developed and well-nourished. No distress. HENT:  
Head: Normocephalic and atraumatic. Eyes: Conjunctivae are normal.  
Neck: Normal range of motion. Neck supple. Cardiovascular: Normal rate. Pulmonary/Chest: Effort normal.  
Abdominal: Soft. Bowel sounds are normal. He exhibits no distension. There is no tenderness. Musculoskeletal: Normal range of motion. Neurological: He is alert. Skin: Skin is warm and dry. He is not diaphoretic. Psychiatric: He has a normal mood and affect. Nursing note and vitals reviewed. Diagnostic Study Results Labs - Recent Results (from the past 12 hour(s)) CBC WITH AUTOMATED DIFF Collection Time: 09/13/18 11:47 AM  
Result Value Ref Range WBC 5.3 4.6 - 13.2 K/uL  
 RBC 5.10 4.70 - 5.50 M/uL  
 HGB 14.7 13.0 - 16.0 g/dL HCT 44.2 36.0 - 48.0 % MCV 86.7 74.0 - 97.0 FL  
 MCH 28.8 24.0 - 34.0 PG  
 MCHC 33.3 31.0 - 37.0 g/dL  
 RDW 13.3 11.6 - 14.5 % PLATELET 885 (L) 288 - 420 K/uL MPV 12.2 (H) 9.2 - 11.8 FL  
 NEUTROPHILS 58 40 - 73 % LYMPHOCYTES 33 21 - 52 % MONOCYTES 8 3 - 10 % EOSINOPHILS 1 0 - 5 % BASOPHILS 0 0 - 2 %  
 ABS. NEUTROPHILS 3.1 1.8 - 8.0 K/UL  
 ABS. LYMPHOCYTES 1.8 0.9 - 3.6 K/UL  
 ABS. MONOCYTES 0.4 0.05 - 1.2 K/UL  
 ABS. EOSINOPHILS 0.0 0.0 - 0.4 K/UL  
 ABS. BASOPHILS 0.0 0.0 - 0.1 K/UL  
 DF AUTOMATED METABOLIC PANEL, COMPREHENSIVE Collection Time: 09/13/18 11:47 AM  
Result Value Ref Range Sodium 142 136 - 145 mmol/L Potassium 3.7 3.5 - 5.5 mmol/L Chloride 109 (H) 100 - 108 mmol/L  
 CO2 24 21 - 32 mmol/L Anion gap 9 3.0 - 18 mmol/L Glucose 114 (H) 74 - 99 mg/dL BUN 19 (H) 7.0 - 18 MG/DL Creatinine 0.97 0.6 - 1.3 MG/DL  
 BUN/Creatinine ratio 20 12 - 20 GFR est AA >60 >60 ml/min/1.73m2 GFR est non-AA >60 >60 ml/min/1.73m2 Calcium 8.3 (L) 8.5 - 10.1 MG/DL Bilirubin, total 0.2 0.2 - 1.0 MG/DL  
 ALT (SGPT) 11 (L) 16 - 61 U/L  
 AST (SGOT) 13 (L) 15 - 37 U/L Alk. phosphatase 66 45 - 117 U/L Protein, total 7.6 6.4 - 8.2 g/dL Albumin 3.7 3.4 - 5.0 g/dL Globulin 3.9 2.0 - 4.0 g/dL A-G Ratio 0.9 0.8 - 1.7 PROTHROMBIN TIME + INR Collection Time: 09/13/18 11:47 AM  
Result Value Ref Range Prothrombin time 14.3 11.5 - 15.2 sec INR 1.1 0.8 - 1.2 PTT Collection Time: 09/13/18 11:47 AM  
Result Value Ref Range aPTT 31.7 23.0 - 36.4 SEC URINALYSIS W/ RFLX MICROSCOPIC Collection Time: 09/13/18  1:13 PM  
Result Value Ref Range Color DARK YELLOW Appearance CLEAR Specific gravity >1.030 (H) 1.005 - 1.030  
 pH (UA) 7.5 5.0 - 8.0 Protein NEGATIVE  NEG mg/dL Glucose NEGATIVE  NEG mg/dL Ketone TRACE (A) NEG mg/dL Bilirubin NEGATIVE  NEG Blood NEGATIVE  NEG Urobilinogen 1.0 0.2 - 1.0 EU/dL Nitrites NEGATIVE  NEG Leukocyte Esterase TRACE (A) NEG Radiologic Studies - No orders to display Medical Decision Making I am the first provider for this patient. I reviewed the vital signs, available nursing notes, past medical history, past surgical history, family history and social history. Vital Signs-Reviewed the patient's vital signs. Records Reviewed: Nursing Notes and Old Medical Records (Time of Review: 12:12 PM) 
 
ED Course: Progress Notes, Reevaluation, and Consults: 
 
1:26 PM 
Lab values unremarkable, no kidney or liver damage. coags normal as expected. No blood in urine today. Discussed proper dosage of xarelto and instructed facility to ONLY prescribe Xarelto OR rivaroxaba, NOT both. Provider Notes (Medical Decision Making): MDM Number of Diagnoses or Management Options Diagnosis management comments: 46yo M with gross hematuria. Based on photos, only a small amount of blood is visible in the urine, no clots. He does not have any physical response to palpation of the abdomen. Vitals are stable and he is afebrile. Diagnosis Clinical Impression: 1. Anticoagulant overdosage, accidental or unintentional, initial encounter Disposition:  
 
Follow-up Information Follow up With Details Comments Contact Info Steffanie Gomez MD Schedule an appointment as soon as possible for a visit  [id number] [facility] [phone] [facility] EMERGENCY DEPT  Immediately if symptoms worsen [address] [place] [address] [address] Patient's Medications Start Taking No medications on file Continue Taking CALCIUM-CHOLECALCIFEROL, D3, 500 MG(1,250MG) -400 UNIT TAB    Take 1 Tab by mouth daily. CHLORHEXIDINE (PERIDEX) 0.12 % SOLUTION    15 mL by Swish and Spit route two (2) times a day.   
 CLOTRIMAZOLE-BETAMETHASONE (LOTRISONE) TOPICAL CREAM    Apply  to affected area two (2) times a day. Apply between toes DIVALPROEX SODIUM (DEPAKOTE PO)    Take 1,000 mg PE by mouth three (3) times daily. 8am, 2pm, and 8pm  
 DOCUSATE SODIUM (COLACE) 100 MG CAPSULE    Take 100 mg by mouth two (2) times a day. LEVOTHYROXINE (LEVOXYL) 75 MCG TABLET    Take 75 mcg by mouth Daily (before breakfast). MULTIVITAMIN (ONE A DAY) TABLET    Take 1 Tab by mouth daily. PHENYTOIN ER (DILANTIN) 30 MG ER CAPSULE    Take 130 mg by mouth daily. POLYETHYLENE GLYCOL (MIRALAX) 17 GRAM PACKET    Take 17 g by mouth every Monday, Wednesday, Friday. TOLNAFTATE (TINACTIN) 1 % TOPICAL POWDER    Apply  to affected area two (2) times a day. ZONISAMIDE (ZONEGRAN) 100 MG CAPSULE    Take  by mouth daily. 3 tabs daily through 11/30 then 4 tabs daily These Medications have changed No medications on file Stop Taking RIVAROXABAN (XARELTO) 15 MG (42)- 20 MG (9) DSPK    Take one 15 mg tablet twice a day with food for the first 21 days. Then, take one 20 mg tablet once a day with food for 9 days. Indications: deep venous thrombosis  
 
_______________________________ Attestations: 
Scribe Attestation Dylan Kaiser PA-C acting as a scribe for and in the presence of [employer] September 13, 2018 at 1:26 PM 
    
Provider Attestation:     
I personally performed the services described in the documentation, reviewed the documentation, as recorded by the scribe in my presence, and it accurately and completely records my words and actions. September 13, 2018 at 1:26 PM - SHILPI Martinez 
_______________________________

## 2018-10-17 ENCOUNTER — OFFICE VISIT (OUTPATIENT)
Dept: CARDIOLOGY CLINIC | Age: 48
End: 2018-10-17

## 2018-10-17 VITALS
DIASTOLIC BLOOD PRESSURE: 80 MMHG | HEIGHT: 67 IN | WEIGHT: 203 LBS | SYSTOLIC BLOOD PRESSURE: 118 MMHG | OXYGEN SATURATION: 96 % | HEART RATE: 75 BPM | BODY MASS INDEX: 31.86 KG/M2

## 2018-10-17 DIAGNOSIS — E03.9 HYPOTHYROIDISM, UNSPECIFIED TYPE: ICD-10-CM

## 2018-10-17 DIAGNOSIS — G40.909 SEIZURE DISORDER (HCC): Primary | ICD-10-CM

## 2018-10-17 DIAGNOSIS — I82.411 ACUTE DEEP VEIN THROMBOSIS (DVT) OF FEMORAL VEIN OF RIGHT LOWER EXTREMITY (HCC): ICD-10-CM

## 2018-10-17 RX ORDER — DOXYLAMINE SUCCINATE 25 MG
TABLET ORAL 2 TIMES DAILY
COMMUNITY

## 2018-10-17 RX ORDER — ARIPIPRAZOLE 5 MG/1
TABLET ORAL DAILY
COMMUNITY

## 2018-10-17 RX ORDER — ACETAMINOPHEN 325 MG/1
TABLET ORAL
COMMUNITY

## 2018-10-17 NOTE — PROGRESS NOTES
1. Have you been to the ER, urgent care clinic since your last visit? Hospitalized since your last visit? No  
 
2. Have you seen or consulted any other health care providers outside of the [address] [place] [address] since your last visit? Include any pap smears or colon screening.   No

## 2018-10-29 PROBLEM — R06.09 DOE (DYSPNEA ON EXERTION): Status: ACTIVE | Noted: 2018-10-29

## 2018-10-29 PROBLEM — I82.409 DVT (DEEP VENOUS THROMBOSIS) (HCC): Status: ACTIVE | Noted: 2018-10-29

## 2018-10-29 NOTE — PROGRESS NOTES
Subjective:  
   Rosalind Boyd is in office today for cardiac evaluation. He is a 43-year-old man with a history of cerebral palsy that was recently  diagnosed with right lower extremity deep venous thrombosis. Patient had some discomfort in his lower right leg. He he started having some shortness of breath. The shortness of breath is gradually improved. He still has some shortness of breath at times with just walking around the house. He is in a group home and so does not get much time for exercise. He reports that his right leg is still a little swollen. He had an echocardiogram done on 8/14/2018. The echo was essentially normal.  There is normal systolic function. There is no evidence of pulmonary hypertension. There is no abnormal valve structure. Patient Active Problem List  
 Diagnosis Date Noted  CERNA (dyspnea on exertion) 10/29/2018  DVT (deep venous thrombosis) ([address] 10/29/2018  Seizure disorder ([facility] 07/18/2012  Hypothyroidism 07/18/2012 Current Outpatient Medications Medication Sig Dispense Refill  ARIPiprazole (ABILIFY) 5 mg tablet Take  by mouth daily.  rivaroxaban (XARELTO) 20 mg tab tablet Take  by mouth daily.  acetaminophen (TYLENOL) 325 mg tablet Take  by mouth every four (4) hours as needed for Pain.  sodium chloride (SALINE MIST) 0.65 % nasal squeeze bottle 1 Spray as needed for Congestion.  miconazole (MICOTIN) 2 % topical cream Apply  to affected area two (2) times a day.  zonisamide (ZONEGRAN) 100 mg capsule Take  by mouth daily. 3 tabs daily through 11/30 then 4 tabs daily  DIVALPROEX SODIUM (DEPAKOTE PO) Take 1,000 mg PE by mouth three (3) times daily. 8am, 2pm, and 8pm    
 levothyroxine (LEVOXYL) 75 mcg tablet Take 75 mcg by mouth Daily (before breakfast).  Calcium-Cholecalciferol, D3, 500 mg(1,250mg) -400 unit tab Take 1 Tab by mouth daily.  multivitamin (ONE A DAY) tablet Take 1 Tab by mouth daily.  polyethylene glycol (MIRALAX) 17 gram packet Take 17 g by mouth every Monday, Wednesday, Friday.  docusate sodium (COLACE) 100 mg capsule Take 100 mg by mouth two (2) times a day.  phenytoin ER (DILANTIN) 30 mg ER capsule Take 130 mg by mouth daily. Allergies Allergen Reactions  Amoxicillin Swelling  Cephalosporins Unable to Obtain  Penicillin G Swelling Past Medical History:  
Diagnosis Date  Endocrine disease   
 hypothyroidism  Neurological disorder   
 cerebral palsy  Seizures ([address] Past Surgical History:  
Procedure Laterality Date  HX ORTHOPAEDIC    
 right heel No family history on file. Social History Tobacco Use Smoking Status Never Smoker Smokeless Tobacco Never Used Review of Systems, additional: 
Constitutional: negative Eyes: negative Respiratory: positive for dyspnea on exertion Cardiovascular: positive for dyspnea on exertion Gastrointestinal: negative Musculoskeletal:negative Neurological: negative Behvioral/Psych: negative Endocrine: negative ENT: negative Objective:  
 
Visit Vitals /80 Pulse 75 Ht 5' 7\" (1.702 m) Wt 203 lb (92.1 kg) SpO2 96% BMI 31.79 kg/m² General:  alert, cooperative, no distress Chest Wall: inspection normal - no chest wall deformities or tenderness, respiratory effort normal  
Lung: clear to auscultation bilaterally Heart:  normal rate and regular rhythm, S1 and S2 normal, no murmurs noted, no gallops noted Abdomen: soft, non-tender. Bowel sounds normal. No masses,  no organomegaly Extremities: extremities normal, atraumatic, no cyanosis . Right lower extremity has 1+ edema. Skin: no rashes Neuro: alert, oriented, normal speech, no focal findings or movement disorder noted [id number]; sinus rhythm. Nonspecific ST wave abnormality. Assessment/Plan: ICD-10-CM ICD-9-CM 1. Seizure disorder ([address] G40.909 345.90 2. Acute deep vein thrombosis (DVT) of femoral vein of right lower extremity ([address] with persistent dyspnea on exertion. No evidence of pulmonary hypertension on echocardiogram done 8/14/2018. No CT for pulmonary embolism done. Will plan to repeat lower extremity venous studies. Return in 2 weeks. I82.411 453.41   
3. Hypothyroidism, unspecified type E03.9 244.9

## 2018-10-30 DIAGNOSIS — M79.89 RIGHT LEG SWELLING: Primary | ICD-10-CM

## 2018-10-30 NOTE — PROGRESS NOTES
Per Dr. Tena Eisenmenger, order for RLE Venous Study for leg swelling on last office visit.     Verbal order and read back per Tiffany Jane MD

## 2018-11-07 ENCOUNTER — HOSPITAL ENCOUNTER (OUTPATIENT)
Dept: VASCULAR SURGERY | Age: 48
Discharge: HOME | End: 2018-11-07
Attending: INTERNAL MEDICINE
Payer: MEDICARE

## 2018-11-07 DIAGNOSIS — M79.89 RIGHT LEG SWELLING: ICD-10-CM

## 2018-11-07 PROCEDURE — 93971 EXTREMITY STUDY: CPT

## 2018-12-18 ENCOUNTER — HOSPITAL ENCOUNTER (OUTPATIENT)
Dept: GENERAL RADIOLOGY | Age: 48
Discharge: HOME | End: 2018-12-18
Payer: MEDICARE

## 2018-12-18 ENCOUNTER — HOSPITAL ENCOUNTER (OUTPATIENT)
Dept: LAB | Age: 48
Discharge: HOME | End: 2018-12-18
Payer: MEDICARE

## 2018-12-18 DIAGNOSIS — R31.9 BLOOD URINE: ICD-10-CM

## 2018-12-18 DIAGNOSIS — R76.11 NONSPECIFIC REACTION TO TUBERCULIN TEST: ICD-10-CM

## 2018-12-18 DIAGNOSIS — D72.819 LEUKOPENIA: ICD-10-CM

## 2018-12-18 DIAGNOSIS — E78.5 HYPERLIPIDEMIA: ICD-10-CM

## 2018-12-18 DIAGNOSIS — G80.9 CEREBRAL PALSY (HCC): ICD-10-CM

## 2018-12-18 LAB
ALBUMIN SERPL-MCNC: 4.2 G/DL (ref 3.4–5)
ALBUMIN/GLOB SERPL: 1 {RATIO} (ref 0.8–1.7)
ALP SERPL-CCNC: 68 U/L (ref 45–117)
ALT SERPL-CCNC: 13 U/L (ref 16–61)
ANION GAP SERPL CALC-SCNC: 6 MMOL/L (ref 3–18)
AST SERPL-CCNC: 12 U/L (ref 15–37)
BASOPHILS # BLD: 0 K/UL (ref 0–0.06)
BASOPHILS NFR BLD: 0 % (ref 0–3)
BILIRUB DIRECT SERPL-MCNC: 0.1 MG/DL (ref 0–0.2)
BILIRUB SERPL-MCNC: 0.3 MG/DL (ref 0.2–1)
BUN SERPL-MCNC: 21 MG/DL (ref 7–18)
BUN/CREAT SERPL: 21 (ref 12–20)
CALCIUM SERPL-MCNC: 9 MG/DL (ref 8.5–10.1)
CHLORIDE SERPL-SCNC: 108 MMOL/L (ref 100–108)
CHOLEST SERPL-MCNC: 326 MG/DL
CO2 SERPL-SCNC: 28 MMOL/L (ref 21–32)
CREAT SERPL-MCNC: 1.01 MG/DL (ref 0.6–1.3)
DIFFERENTIAL METHOD BLD: ABNORMAL
EOSINOPHIL # BLD: 0.1 K/UL (ref 0–0.4)
EOSINOPHIL NFR BLD: 2 % (ref 0–5)
ERYTHROCYTE [DISTWIDTH] IN BLOOD BY AUTOMATED COUNT: 14.5 % (ref 11.6–14.5)
GLOBULIN SER CALC-MCNC: 4.1 G/DL (ref 2–4)
GLUCOSE SERPL-MCNC: 80 MG/DL (ref 74–99)
HCT VFR BLD AUTO: 46.3 % (ref 36–48)
HDLC SERPL-MCNC: 66 MG/DL (ref 40–60)
HDLC SERPL: 4.9 {RATIO} (ref 0–5)
HGB BLD-MCNC: 15.6 G/DL (ref 13–16)
LDLC SERPL CALC-MCNC: 228.4 MG/DL (ref 0–100)
LIPID PROFILE,FLP: ABNORMAL
LYMPHOCYTES # BLD: 2.7 K/UL (ref 0.8–3.5)
LYMPHOCYTES NFR BLD: 72 % (ref 20–51)
MCH RBC QN AUTO: 28.9 PG (ref 24–34)
MCHC RBC AUTO-ENTMCNC: 33.7 G/DL (ref 31–37)
MCV RBC AUTO: 85.7 FL (ref 74–97)
MONOCYTES # BLD: 0.2 K/UL (ref 0–1)
MONOCYTES NFR BLD: 5 % (ref 2–9)
NEUTS SEG # BLD: 0.8 K/UL (ref 1.8–8)
NEUTS SEG NFR BLD: 21 % (ref 42–75)
PLATELET # BLD AUTO: 94 K/UL (ref 135–420)
PLATELET COMMENTS,PCOM: ABNORMAL
PMV BLD AUTO: 12.6 FL (ref 9.2–11.8)
POTASSIUM SERPL-SCNC: 4.1 MMOL/L (ref 3.5–5.5)
PROT SERPL-MCNC: 8.3 G/DL (ref 6.4–8.2)
RBC # BLD AUTO: 5.4 M/UL (ref 4.7–5.5)
RBC MORPH BLD: ABNORMAL
SODIUM SERPL-SCNC: 142 MMOL/L (ref 136–145)
TRIGL SERPL-MCNC: 158 MG/DL (ref ?–150)
VLDLC SERPL CALC-MCNC: 31.6 MG/DL
WBC # BLD AUTO: 3.8 K/UL (ref 4.6–13.2)
WBC MORPH BLD: ABNORMAL

## 2018-12-18 PROCEDURE — 71046 X-RAY EXAM CHEST 2 VIEWS: CPT

## 2018-12-18 PROCEDURE — 85025 COMPLETE CBC W/AUTO DIFF WBC: CPT

## 2018-12-18 PROCEDURE — 36415 COLL VENOUS BLD VENIPUNCTURE: CPT

## 2018-12-18 PROCEDURE — 80076 HEPATIC FUNCTION PANEL: CPT

## 2018-12-18 PROCEDURE — 80061 LIPID PANEL: CPT

## 2018-12-18 PROCEDURE — 80048 BASIC METABOLIC PNL TOTAL CA: CPT

## 2019-03-06 ENCOUNTER — HOSPITAL ENCOUNTER (OUTPATIENT)
Dept: MRI IMAGING | Age: 49
Discharge: HOME | End: 2019-03-06
Attending: FAMILY MEDICINE
Payer: MEDICARE

## 2019-03-06 ENCOUNTER — HOSPITAL ENCOUNTER (OUTPATIENT)
Dept: GENERAL RADIOLOGY | Age: 49
Discharge: HOME | End: 2019-03-06
Attending: FAMILY MEDICINE
Payer: MEDICARE

## 2019-03-06 DIAGNOSIS — M95.2 FRONTAL BONE DEFORMITY: ICD-10-CM

## 2019-03-06 DIAGNOSIS — M62.89 MASS OF MUSCLE OF RIGHT UPPER EXTREMITY: ICD-10-CM

## 2019-03-06 DIAGNOSIS — E04.9 ENLARGEMENT OF THYROID: ICD-10-CM

## 2019-03-06 PROCEDURE — 70260 X-RAY EXAM OF SKULL: CPT

## 2019-03-12 ENCOUNTER — HOSPITAL ENCOUNTER (OUTPATIENT)
Dept: ULTRASOUND IMAGING | Age: 49
Discharge: HOME | End: 2019-03-12
Attending: FAMILY MEDICINE
Payer: MEDICARE

## 2019-03-12 DIAGNOSIS — E04.9 THYROID ENLARGED: ICD-10-CM

## 2019-03-12 PROCEDURE — 76536 US EXAM OF HEAD AND NECK: CPT

## 2019-05-17 ENCOUNTER — HOSPITAL ENCOUNTER (OUTPATIENT)
Dept: LAB | Age: 49
Discharge: HOME | End: 2019-05-17
Payer: MEDICARE

## 2019-05-17 DIAGNOSIS — D72.819 DECREASED WHITE BLOOD CELL COUNT: ICD-10-CM

## 2019-05-17 DIAGNOSIS — I07.1: ICD-10-CM

## 2019-05-17 DIAGNOSIS — F79 UNSPECIFIED INTELLECTUAL DISABILITIES: ICD-10-CM

## 2019-05-17 DIAGNOSIS — G40.909 EPILEPSY (HCC): ICD-10-CM

## 2019-05-17 DIAGNOSIS — I82.401 ACUTE EMBOLISM AND THROMBOSIS OF DEEP VEIN OF RIGHT LOWER EXTREMITY (HCC): ICD-10-CM

## 2019-05-17 DIAGNOSIS — G83.89 CEREBRAL PARESIS WITH HOMOLATERAL ATAXIA (HCC): ICD-10-CM

## 2019-05-17 DIAGNOSIS — D69.6 THROMBOCYTOPENIA, UNSPECIFIED (HCC): ICD-10-CM

## 2019-05-17 DIAGNOSIS — R26.9 ABNORMALITY OF GAIT: ICD-10-CM

## 2019-05-17 DIAGNOSIS — H53.461 RIGHT HOMONYMOUS HEMIANOPSIA: ICD-10-CM

## 2019-05-17 DIAGNOSIS — H91.90 PROBLEMS WITH HEARING: ICD-10-CM

## 2019-05-17 DIAGNOSIS — E03.9 HYPOTHYROIDISM: ICD-10-CM

## 2019-05-17 LAB
ALBUMIN SERPL-MCNC: 4.1 G/DL (ref 3.4–5)
ALBUMIN/GLOB SERPL: 1.1 {RATIO} (ref 0.8–1.7)
ALP SERPL-CCNC: 60 U/L (ref 45–117)
ALT SERPL-CCNC: 11 U/L (ref 16–61)
ANION GAP SERPL CALC-SCNC: 8 MMOL/L (ref 3–18)
AST SERPL-CCNC: 12 U/L (ref 15–37)
BASOPHILS # BLD: 0 K/UL (ref 0–0.06)
BASOPHILS NFR BLD: 0 % (ref 0–3)
BILIRUB DIRECT SERPL-MCNC: 0.1 MG/DL (ref 0–0.2)
BILIRUB SERPL-MCNC: 0.2 MG/DL (ref 0.2–1)
BUN SERPL-MCNC: 20 MG/DL (ref 7–18)
BUN/CREAT SERPL: 19 (ref 12–20)
CALCIUM SERPL-MCNC: 9 MG/DL (ref 8.5–10.1)
CHLORIDE SERPL-SCNC: 107 MMOL/L (ref 100–108)
CHOLEST SERPL-MCNC: 219 MG/DL
CO2 SERPL-SCNC: 28 MMOL/L (ref 21–32)
CREAT SERPL-MCNC: 1.05 MG/DL (ref 0.6–1.3)
DIFFERENTIAL METHOD BLD: ABNORMAL
EOSINOPHIL # BLD: 0 K/UL (ref 0–0.4)
EOSINOPHIL NFR BLD: 0 % (ref 0–5)
ERYTHROCYTE [DISTWIDTH] IN BLOOD BY AUTOMATED COUNT: 13.3 % (ref 11.6–14.5)
GLOBULIN SER CALC-MCNC: 3.6 G/DL (ref 2–4)
GLUCOSE SERPL-MCNC: 89 MG/DL (ref 74–99)
HCT VFR BLD AUTO: 44.9 % (ref 36–48)
HDLC SERPL-MCNC: 73 MG/DL (ref 40–60)
HDLC SERPL: 3 {RATIO} (ref 0–5)
HGB BLD-MCNC: 15.1 G/DL (ref 13–16)
LDLC SERPL CALC-MCNC: 123 MG/DL (ref 0–100)
LIPID PROFILE,FLP: ABNORMAL
LYMPHOCYTES # BLD: 2.8 K/UL (ref 0.8–3.5)
LYMPHOCYTES NFR BLD: 76 % (ref 20–51)
MCH RBC QN AUTO: 29.7 PG (ref 24–34)
MCHC RBC AUTO-ENTMCNC: 33.6 G/DL (ref 31–37)
MCV RBC AUTO: 88.2 FL (ref 74–97)
MONOCYTES # BLD: 0.1 K/UL (ref 0–1)
MONOCYTES NFR BLD: 3 % (ref 2–9)
NEUTS SEG # BLD: 0.8 K/UL (ref 1.8–8)
NEUTS SEG NFR BLD: 21 % (ref 42–75)
NRBC BLD-RTO: 1 PER 100 WBC
PLATELET # BLD AUTO: 99 K/UL (ref 135–420)
PLATELET COMMENTS,PCOM: ABNORMAL
POTASSIUM SERPL-SCNC: 3.9 MMOL/L (ref 3.5–5.5)
PROT SERPL-MCNC: 7.7 G/DL (ref 6.4–8.2)
PSA SERPL-MCNC: 0.4 NG/ML (ref 0–4)
RBC # BLD AUTO: 5.09 M/UL (ref 4.7–5.5)
RBC MORPH BLD: ABNORMAL
SODIUM SERPL-SCNC: 143 MMOL/L (ref 136–145)
T4 FREE SERPL-MCNC: 0.6 NG/DL (ref 0.7–1.5)
TRIGL SERPL-MCNC: 115 MG/DL (ref ?–150)
TSH SERPL DL<=0.05 MIU/L-ACNC: 0.72 UIU/ML (ref 0.36–3.74)
VLDLC SERPL CALC-MCNC: 23 MG/DL
WBC # BLD AUTO: 3.7 K/UL (ref 4.6–13.2)

## 2019-05-17 PROCEDURE — 85025 COMPLETE CBC W/AUTO DIFF WBC: CPT

## 2019-05-17 PROCEDURE — 84443 ASSAY THYROID STIM HORMONE: CPT

## 2019-05-17 PROCEDURE — 36415 COLL VENOUS BLD VENIPUNCTURE: CPT

## 2019-05-17 PROCEDURE — 84153 ASSAY OF PSA TOTAL: CPT

## 2019-05-17 PROCEDURE — 80048 BASIC METABOLIC PNL TOTAL CA: CPT

## 2019-05-17 PROCEDURE — 84439 ASSAY OF FREE THYROXINE: CPT

## 2019-05-17 PROCEDURE — 80076 HEPATIC FUNCTION PANEL: CPT

## 2019-05-17 PROCEDURE — 80061 LIPID PANEL: CPT

## 2019-08-15 ENCOUNTER — APPOINTMENT (OUTPATIENT)
Dept: CT IMAGING | Age: 49
End: 2019-08-15
Attending: EMERGENCY MEDICINE
Payer: MEDICARE

## 2019-08-15 ENCOUNTER — HOSPITAL ENCOUNTER (EMERGENCY)
Age: 49
Discharge: HOME | End: 2019-08-15
Attending: EMERGENCY MEDICINE | Admitting: EMERGENCY MEDICINE
Payer: MEDICARE

## 2019-08-15 VITALS
RESPIRATION RATE: 18 BRPM | HEIGHT: 68 IN | OXYGEN SATURATION: 98 % | TEMPERATURE: 97.6 F | HEART RATE: 65 BPM | SYSTOLIC BLOOD PRESSURE: 124 MMHG | WEIGHT: 204 LBS | BODY MASS INDEX: 30.92 KG/M2 | DIASTOLIC BLOOD PRESSURE: 72 MMHG

## 2019-08-15 DIAGNOSIS — K59.00 CONSTIPATION, UNSPECIFIED CONSTIPATION TYPE: Primary | ICD-10-CM

## 2019-08-15 LAB
ALBUMIN SERPL-MCNC: 3.8 G/DL (ref 3.4–5)
ALBUMIN/GLOB SERPL: 1.1 {RATIO} (ref 0.8–1.7)
ALP SERPL-CCNC: 79 U/L (ref 45–117)
ALT SERPL-CCNC: 13 U/L (ref 16–61)
ANION GAP SERPL CALC-SCNC: 4 MMOL/L (ref 3–18)
APPEARANCE UR: CLEAR
AST SERPL-CCNC: 26 U/L (ref 10–38)
BASOPHILS # BLD: 0 K/UL (ref 0–0.1)
BASOPHILS NFR BLD: 0 % (ref 0–2)
BILIRUB SERPL-MCNC: 0.3 MG/DL (ref 0.2–1)
BILIRUB UR QL: NEGATIVE
BUN SERPL-MCNC: 12 MG/DL (ref 7–18)
BUN/CREAT SERPL: 13 (ref 12–20)
CALCIUM SERPL-MCNC: 8.7 MG/DL (ref 8.5–10.1)
CHLORIDE SERPL-SCNC: 104 MMOL/L (ref 100–111)
CO2 SERPL-SCNC: 31 MMOL/L (ref 21–32)
COLOR UR: NORMAL
CREAT SERPL-MCNC: 0.9 MG/DL (ref 0.6–1.3)
DIFFERENTIAL METHOD BLD: ABNORMAL
EOSINOPHIL # BLD: 0 K/UL (ref 0–0.4)
EOSINOPHIL NFR BLD: 1 % (ref 0–5)
ERYTHROCYTE [DISTWIDTH] IN BLOOD BY AUTOMATED COUNT: 13.6 % (ref 11.6–14.5)
GLOBULIN SER CALC-MCNC: 3.4 G/DL (ref 2–4)
GLUCOSE SERPL-MCNC: 98 MG/DL (ref 74–99)
GLUCOSE UR STRIP.AUTO-MCNC: NEGATIVE MG/DL
HCT VFR BLD AUTO: 39.6 % (ref 36–48)
HGB BLD-MCNC: 13 G/DL (ref 13–16)
HGB UR QL STRIP: NEGATIVE
KETONES UR QL STRIP.AUTO: NEGATIVE MG/DL
LEUKOCYTE ESTERASE UR QL STRIP.AUTO: NEGATIVE
LIPASE SERPL-CCNC: 123 U/L (ref 73–393)
LYMPHOCYTES # BLD: 2.1 K/UL (ref 0.9–3.6)
LYMPHOCYTES NFR BLD: 49 % (ref 21–52)
MCH RBC QN AUTO: 29.2 PG (ref 24–34)
MCHC RBC AUTO-ENTMCNC: 32.8 G/DL (ref 31–37)
MCV RBC AUTO: 89 FL (ref 74–97)
MONOCYTES # BLD: 0.4 K/UL (ref 0.05–1.2)
MONOCYTES NFR BLD: 9 % (ref 3–10)
NEUTS SEG # BLD: 1.7 K/UL (ref 1.8–8)
NEUTS SEG NFR BLD: 41 % (ref 40–73)
NITRITE UR QL STRIP.AUTO: NEGATIVE
PH UR STRIP: 7.5 [PH] (ref 5–8)
PLATELET # BLD AUTO: 105 K/UL (ref 135–420)
PMV BLD AUTO: 12.6 FL (ref 9.2–11.8)
POTASSIUM SERPL-SCNC: 4.5 MMOL/L (ref 3.5–5.5)
PROT SERPL-MCNC: 7.2 G/DL (ref 6.4–8.2)
PROT UR STRIP-MCNC: NEGATIVE MG/DL
RBC # BLD AUTO: 4.45 M/UL (ref 4.7–5.5)
SODIUM SERPL-SCNC: 139 MMOL/L (ref 136–145)
SP GR UR REFRACTOMETRY: 1.02 (ref 1–1.03)
UROBILINOGEN UR QL STRIP.AUTO: 1 EU/DL (ref 0.2–1)
WBC # BLD AUTO: 4.3 K/UL (ref 4.6–13.2)

## 2019-08-15 PROCEDURE — 81003 URINALYSIS AUTO W/O SCOPE: CPT

## 2019-08-15 PROCEDURE — 99284 EMERGENCY DEPT VISIT MOD MDM: CPT

## 2019-08-15 PROCEDURE — 74011250637 HC RX REV CODE- 250/637: Performed by: EMERGENCY MEDICINE

## 2019-08-15 PROCEDURE — 74011636320 HC RX REV CODE- 636/320: Performed by: EMERGENCY MEDICINE

## 2019-08-15 PROCEDURE — 85025 COMPLETE CBC W/AUTO DIFF WBC: CPT

## 2019-08-15 PROCEDURE — 80053 COMPREHEN METABOLIC PANEL: CPT

## 2019-08-15 PROCEDURE — 83690 ASSAY OF LIPASE: CPT

## 2019-08-15 PROCEDURE — 74177 CT ABD & PELVIS W/CONTRAST: CPT

## 2019-08-15 RX ORDER — ACETAMINOPHEN 500 MG
1000 TABLET ORAL ONCE
Status: COMPLETED | OUTPATIENT
Start: 2019-08-15 | End: 2019-08-15

## 2019-08-15 RX ORDER — DIVALPROEX SODIUM 250 MG/1
500 TABLET, EXTENDED RELEASE ORAL
Status: COMPLETED | OUTPATIENT
Start: 2019-08-15 | End: 2019-08-15

## 2019-08-15 RX ORDER — POLYETHYLENE GLYCOL 3350 17 G/17G
17 POWDER, FOR SOLUTION ORAL 2 TIMES DAILY
Qty: 238 G | Refills: 0 | Status: SHIPPED | OUTPATIENT
Start: 2019-08-15 | End: 2019-08-22

## 2019-08-15 RX ADMIN — IOPAMIDOL 100 ML: 612 INJECTION, SOLUTION INTRAVENOUS at 17:13

## 2019-08-15 RX ADMIN — ACETAMINOPHEN 1000 MG: 500 TABLET, FILM COATED ORAL at 18:05

## 2019-08-15 RX ADMIN — DIVALPROEX SODIUM 500 MG: 250 TABLET, EXTENDED RELEASE ORAL at 18:05

## 2019-08-15 NOTE — ED PROVIDER NOTES
EMERGENCY DEPARTMENT HISTORY AND PHYSICAL EXAM    3:01 PM      Date: 8/15/2019  Patient Name: Boston Shepard    History of Presenting Illness     Chief Complaint   Patient presents with    Foot Pain    Abdominal Pain         History Provided By: Patient's mother    Additional History (Context): Boston Shepard is a 52 y.o. male presents with abdominal pain starting last night. Unknown when his last bowel movement was. He has had problems with constipation. No fever or vomiting. History review of systems limited by patient's mental retardation and cerebral palsy. He is able to indicate that his pain is more on the right side of his abdomen. Allayne Claire PCP: Petra Saul MD    Chief Complaint:   Duration:    Timing:    Location:   Quality:   Severity:   Modifying Factors:   Associated Symptoms:       Current Outpatient Medications   Medication Sig Dispense Refill    polyethylene glycol (MIRALAX) 17 gram/dose powder Take 17 g by mouth two (2) times a day for 7 days. 1 tablespoon with 8 oz of water daily 238 g 0    ARIPiprazole (ABILIFY) 5 mg tablet Take  by mouth daily.  rivaroxaban (XARELTO) 20 mg tab tablet Take  by mouth daily.  acetaminophen (TYLENOL) 325 mg tablet Take  by mouth every four (4) hours as needed for Pain.  sodium chloride (SALINE MIST) 0.65 % nasal squeeze bottle 1 Spray as needed for Congestion.  miconazole (MICOTIN) 2 % topical cream Apply  to affected area two (2) times a day.  zonisamide (ZONEGRAN) 100 mg capsule Take  by mouth daily. 3 tabs daily through 11/30 then 4 tabs daily      DIVALPROEX SODIUM (DEPAKOTE PO) Take 1,000 mg PE by mouth three (3) times daily. 8am, 2pm, and 8pm      phenytoin ER (DILANTIN) 30 mg ER capsule Take 130 mg by mouth daily.  levothyroxine (LEVOXYL) 75 mcg tablet Take 75 mcg by mouth Daily (before breakfast).  Calcium-Cholecalciferol, D3, 500 mg(1,250mg) -400 unit tab Take 1 Tab by mouth daily.       multivitamin (ONE A DAY) tablet Take 1 Tab by mouth daily.  docusate sodium (COLACE) 100 mg capsule Take 100 mg by mouth two (2) times a day. Past History     Past Medical History:  Past Medical History:   Diagnosis Date    Endocrine disease     hypothyroidism    High cholesterol     Neurological disorder     cerebral palsy    Seizures ([address]        Past Surgical History:  Past Surgical History:   Procedure Laterality Date    HX ORTHOPAEDIC      right heel        Family History:  History reviewed. No pertinent family history. Social History:  Social History     Tobacco Use    Smoking status: Never Smoker    Smokeless tobacco: Never Used   Substance Use Topics    Alcohol use: No    Drug use: No       Allergies: Allergies   Allergen Reactions    Ampicillin Swelling    Amoxicillin Swelling    Cephalosporins Unable to Obtain    Penicillin G Swelling         Review of Systems     Review of Systems      Physical Exam       Patient Vitals for the past 12 hrs:   Temp Pulse Resp BP SpO2   08/15/19 1318 97.6 °F (36.4 °C) 68 20 130/79 97 %       Physical Exam   Constitutional: He appears well-developed and well-nourished. HENT:   Head: Normocephalic and atraumatic. Eyes: Conjunctivae are normal. No scleral icterus. Neck: Normal range of motion. Neck supple. No JVD present. Cardiovascular: Normal rate, regular rhythm and normal heart sounds. 4 intact extremity pulses   Pulmonary/Chest: Effort normal and breath sounds normal.   Abdominal: Soft. He exhibits no mass. There is no tenderness (Localizes the pain to the right side, but does not appear tender with palpation. ). Musculoskeletal: Normal range of motion. Lymphadenopathy:     He has no cervical adenopathy. Neurological: He is alert. Skin: Skin is warm and dry. Nursing note and vitals reviewed.         Diagnostic Study Results   Labs -  Recent Results (from the past 12 hour(s))   CBC WITH AUTOMATED DIFF    Collection Time: 08/15/19  2:55 PM Result Value Ref Range    WBC 4.3 (L) 4.6 - 13.2 K/uL    RBC 4.45 (L) 4.70 - 5.50 M/uL    HGB 13.0 13.0 - 16.0 g/dL    HCT 39.6 36.0 - 48.0 %    MCV 89.0 74.0 - 97.0 FL    MCH 29.2 24.0 - 34.0 PG    MCHC 32.8 31.0 - 37.0 g/dL    RDW 13.6 11.6 - 14.5 %    PLATELET 946 (L) 597 - 420 K/uL    MPV 12.6 (H) 9.2 - 11.8 FL    NEUTROPHILS 41 40 - 73 %    LYMPHOCYTES 49 21 - 52 %    MONOCYTES 9 3 - 10 %    EOSINOPHILS 1 0 - 5 %    BASOPHILS 0 0 - 2 %    ABS. NEUTROPHILS 1.7 (L) 1.8 - 8.0 K/UL    ABS. LYMPHOCYTES 2.1 0.9 - 3.6 K/UL    ABS. MONOCYTES 0.4 0.05 - 1.2 K/UL    ABS. EOSINOPHILS 0.0 0.0 - 0.4 K/UL    ABS. BASOPHILS 0.0 0.0 - 0.1 K/UL    DF AUTOMATED     METABOLIC PANEL, COMPREHENSIVE    Collection Time: 08/15/19  2:55 PM   Result Value Ref Range    Sodium 139 136 - 145 mmol/L    Potassium 4.5 3.5 - 5.5 mmol/L    Chloride 104 100 - 111 mmol/L    CO2 31 21 - 32 mmol/L    Anion gap 4 3.0 - 18 mmol/L    Glucose 98 74 - 99 mg/dL    BUN 12 7.0 - 18 MG/DL    Creatinine 0.90 0.6 - 1.3 MG/DL    BUN/Creatinine ratio 13 12 - 20      GFR est AA >60 >60 ml/min/1.73m2    GFR est non-AA >60 >60 ml/min/1.73m2    Calcium 8.7 8.5 - 10.1 MG/DL    Bilirubin, total 0.3 0.2 - 1.0 MG/DL    ALT (SGPT) 13 (L) 16 - 61 U/L    AST (SGOT) 26 10 - 38 U/L    Alk.  phosphatase 79 45 - 117 U/L    Protein, total 7.2 6.4 - 8.2 g/dL    Albumin 3.8 3.4 - 5.0 g/dL    Globulin 3.4 2.0 - 4.0 g/dL    A-G Ratio 1.1 0.8 - 1.7     LIPASE    Collection Time: 08/15/19  2:55 PM   Result Value Ref Range    Lipase 123 73 - 393 U/L   URINALYSIS W/ RFLX MICROSCOPIC    Collection Time: 08/15/19  5:27 PM   Result Value Ref Range    Color DARK YELLOW      Appearance CLEAR      Specific gravity 1.019 1.005 - 1.030      pH (UA) 7.5 5.0 - 8.0      Protein NEGATIVE  NEG mg/dL    Glucose NEGATIVE  NEG mg/dL    Ketone NEGATIVE  NEG mg/dL    Bilirubin NEGATIVE  NEG      Blood NEGATIVE  NEG      Urobilinogen 1.0 0.2 - 1.0 EU/dL    Nitrites NEGATIVE  NEG      Leukocyte Esterase NEGATIVE  NEG         Radiologic Studies -   CT ABD PELV W CONT   Final Result   IMPRESSION:      Small right pleural effusion. Probable pericardial cyst versus enlarged pericardial lymph node. Evaluation the urinary bladder is limited due to under distention. There is some   mild wall thickening which may reflect under distention or cystitis correlate   with urinalysis. Large amount of stool in the colon. Ct Abd Pelv W Cont    Result Date: 8/15/2019  EXAM: CT of the abdomen and pelvis INDICATION: Left abdominal pain COMPARISON: None. TECHNIQUE: Axial CT imaging of the abdomen and pelvis was performed with intravenous contrast. Multiplanar reformats were generated. One or more dose reduction techniques were used on this CT: automated exposure control, adjustment of the mAs and/or kVp according to patient size, and iterative reconstruction techniques. The specific techniques used on this CT exam have been documented in the patient's electronic medical record. Digital Imaging and Communications in Medicine (DICOM) format image data are available to nonaffiliated external healthcare facilities or entities on a secure, media free, reciprocally searchable basis with patient authorization for at least a 12-month period after this study. _______________ FINDINGS: LOWER CHEST: There is small right effusion. Cardiac size is normal. Groundglass densities are seen in both lung bases. This is nonspecific. There is an ovoid 1.4 x 1.7 cm low-attenuation structure adjacent to the left atrium on the right with Hounsfield units of 24 suggesting a probable pericardial cyst versus enlarged paracardiac lymph node. LIVER, BILIARY: Liver is normal. No biliary dilation. Gallbladder is unremarkable. PANCREAS: Normal. SPLEEN: Normal. ADRENALS: Normal. KIDNEYS: Small cortical cyst seen mid pole the right kidney otherwise kidneys unremarkable. VASCULATURE: Mild calcific atherosclerosis present.  LYMPH NODES: No enlarged lymph nodes. GASTROINTESTINAL TRACT: No bowel dilation or wall thickening. Appendix is normal. Large amount of stool present in the colon. PELVIC ORGANS: Urinary bladder is under distended. There is wall thickening the urinary bladder which may reflect under distention versus cystitis correlate with urinalysis. There is no free fluid. BONES: No acute or aggressive osseous abnormalities identified. OTHER: None. _______________     IMPRESSION: Small right pleural effusion. Probable pericardial cyst versus enlarged pericardial lymph node. Evaluation the urinary bladder is limited due to under distention. There is some mild wall thickening which may reflect under distention or cystitis correlate with urinalysis. Large amount of stool in the colon. Medications ordered:   Medications   acetaminophen (TYLENOL) tablet 1,000 mg (1,000 mg Oral Given 8/15/19 1805)   iopamidol (ISOVUE 300) 61 % contrast injection 100 mL (100 mL IntraVENous Given 8/15/19 1713)   divalproex ER (DEPAKOTE ER) 24 hour tablet 500 mg (500 mg Oral Given 8/15/19 1805)         Medical Decision Making   Initial Medical Decision Making and DDx:  Bowel obstruction appendicitis renal colic. ED Course: Progress Notes, Reevaluation, and Consults:     6:29 PM  Work-up reveals significant constipation. No hernia appendicitis diverticulitis or bowel obstruction. No metabolic explanation for his belly pain. He will be discharged with a double dose of MiraLAX. I did look at his feet due to his chronic foot pain no evidence of acute injury. I think he does have flat feet, they have podiatry for follow-up appointment already scheduled. I am the first provider for this patient. I reviewed the vital signs, available nursing notes, past medical history, past surgical history, family history and social history.     Patient Vitals for the past 12 hrs:   Temp Pulse Resp BP SpO2   08/15/19 1318 97.6 °F (36.4 °C) 68 20 130/79 97 %       Vital Signs-Reviewed the patient's vital signs. Pulse Oximetry Analysis, Cardiac Monitor, 12 lead ekg:    Interpreted by the EP. Records Reviewed: Nursing notes reviewed (Time of Review: 3:01 PM)    Procedures:   Critical Care Time:   Aspirin: (was aspirin given for stroke?)    Diagnosis     Clinical Impression:   1. Constipation, unspecified constipation type        Disposition: Discharged      Follow-up Information     Follow up With Specialties Details Why Radu Barkley MD [facility] In 2 days  [facility]  [phone]             Patient's Medications   Start Taking    POLYETHYLENE GLYCOL (MIRALAX) 17 GRAM/DOSE POWDER    Take 17 g by mouth two (2) times a day for 7 days. 1 tablespoon with 8 oz of water daily   Continue Taking    ACETAMINOPHEN (TYLENOL) 325 MG TABLET    Take  by mouth every four (4) hours as needed for Pain. ARIPIPRAZOLE (ABILIFY) 5 MG TABLET    Take  by mouth daily. CALCIUM-CHOLECALCIFEROL, D3, 500 MG(1,250MG) -400 UNIT TAB    Take 1 Tab by mouth daily. DIVALPROEX SODIUM (DEPAKOTE PO)    Take 1,000 mg PE by mouth three (3) times daily. 8am, 2pm, and 8pm    DOCUSATE SODIUM (COLACE) 100 MG CAPSULE    Take 100 mg by mouth two (2) times a day. LEVOTHYROXINE (LEVOXYL) 75 MCG TABLET    Take 75 mcg by mouth Daily (before breakfast). MICONAZOLE (MICOTIN) 2 % TOPICAL CREAM    Apply  to affected area two (2) times a day. MULTIVITAMIN (ONE A DAY) TABLET    Take 1 Tab by mouth daily. PHENYTOIN ER (DILANTIN) 30 MG ER CAPSULE    Take 130 mg by mouth daily. RIVAROXABAN (XARELTO) 20 MG TAB TABLET    Take  by mouth daily. SODIUM CHLORIDE (SALINE MIST) 0.65 % NASAL SQUEEZE BOTTLE    1 Spray as needed for Congestion. ZONISAMIDE (ZONEGRAN) 100 MG CAPSULE    Take  by mouth daily.  3 tabs daily through 11/30 then 4 tabs daily   These Medications have changed    No medications on file   Stop Taking    POLYETHYLENE GLYCOL (MIRALAX) 17 GRAM PACKET    Take 17 g by mouth every Monday, Wednesday, Friday.     _______________________________    Notes:    Tequila Baum MD using Kiersten dictation      _______________________________

## 2019-08-15 NOTE — ED TRIAGE NOTES
Pt arrived from group home with mother, with c/o bilateral foot pain and left abdominal pain. Pt is non verbal and deaf. Last bm unknown. Pt for follow up with podiatry appointment in 1 month. Pt in new group home that is much more active than previous home.

## 2019-08-15 NOTE — ED NOTES
Patient remains alert, oriented, and at baseline. Discharge, meds, and follow up information reviewed. Mother and father are at bedside. Needs met. Denies pain.

## 2019-08-15 NOTE — DISCHARGE INSTRUCTIONS
Patient Education        Constipation: Care Instructions  Your Care Instructions    Constipation means that you have a hard time passing stools (bowel movements). People pass stools from 3 times a day to once every 3 days. What is normal for you may be different. Constipation may occur with pain in the rectum and cramping. The pain may get worse when you try to pass stools. Sometimes there are small amounts of bright red blood on toilet paper or the surface of stools. This is because of enlarged veins near the rectum (hemorrhoids). A few changes in your diet and lifestyle may help you avoid ongoing constipation. Your doctor may also prescribe medicine to help loosen your stool. Some medicines can cause constipation. These include pain medicines and antidepressants. Tell your doctor about all the medicines you take. Your doctor may want to make a medicine change to ease your symptoms. Follow-up care is a key part of your treatment and safety. Be sure to make and go to all appointments, and call your doctor if you are having problems. It's also a good idea to know your test results and keep a list of the medicines you take. How can you care for yourself at home? · Drink plenty of fluids, enough so that your urine is light yellow or clear like water. If you have kidney, heart, or liver disease and have to limit fluids, talk with your doctor before you increase the amount of fluids you drink. · Include high-fiber foods in your diet each day. These include fruits, vegetables, beans, and whole grains. · Get at least 30 minutes of exercise on most days of the week. Walking is a good choice. You also may want to do other activities, such as running, swimming, cycling, or playing tennis or team sports. · Take a fiber supplement, such as Citrucel or Metamucil, every day. Read and follow all instructions on the label. · Schedule time each day for a bowel movement. A daily routine may help.  Take your time having your bowel movement. · Support your feet with a small step stool when you sit on the toilet. This helps flex your hips and places your pelvis in a squatting position. · Your doctor may recommend an over-the-counter laxative to relieve your constipation. Examples are Milk of Magnesia and MiraLax. Read and follow all instructions on the label. Do not use laxatives on a long-term basis. When should you call for help? Call your doctor now or seek immediate medical care if:    · You have new or worse belly pain.     · You have new or worse nausea or vomiting.     · You have blood in your stools.    Watch closely for changes in your health, and be sure to contact your doctor if:    · Your constipation is getting worse.     · You do not get better as expected. Where can you learn more? Go to http://marlon-dom.info/. Enter 21 [phone] in the search box to learn more about \"Constipation: Care Instructions. \"  Current as of: September 23, 2018  Content Version: 12.1  © 5251-3298 [employer]. Care instructions adapted under license by [employer] (which disclaims liability or warranty for this information). If you have questions about a medical condition or this instruction, always ask your healthcare professional. [place] [address] any warranty or liability for your use of this information.

## 2020-01-20 ENCOUNTER — OFFICE VISIT (OUTPATIENT)
Dept: CARDIOLOGY CLINIC | Age: 50
End: 2020-01-20

## 2020-01-20 VITALS
HEART RATE: 72 BPM | SYSTOLIC BLOOD PRESSURE: 117 MMHG | OXYGEN SATURATION: 99 % | WEIGHT: 193 LBS | DIASTOLIC BLOOD PRESSURE: 75 MMHG | BODY MASS INDEX: 29.35 KG/M2

## 2020-01-20 DIAGNOSIS — R06.09 DYSPNEA ON EXERTION: Primary | ICD-10-CM

## 2020-01-20 RX ORDER — CARBAMAZEPINE 300 MG/1
300 CAPSULE, EXTENDED RELEASE ORAL 2 TIMES DAILY
COMMUNITY

## 2020-01-20 RX ORDER — PAROXETINE HYDROCHLORIDE 20 MG/1
TABLET, FILM COATED ORAL DAILY
COMMUNITY

## 2020-01-20 NOTE — PROGRESS NOTES
1. Have you been to the ER, urgent care clinic since your last visit? Hospitalized since your last visit? No    2. Have you seen or consulted any other health care providers outside of the [address] [place] [address] since your last visit? Include any pap smears or colon screening.  No

## 2020-01-21 NOTE — PROGRESS NOTES
Subjective:      Francisco Serrato is in office today for cardiac re-evaluation. He is a 55-year-old man with a history of cerebral palsy that was  diagnosed with right lower extremity deep venous thrombosis in August 2018. The patient had some discomfort in his lower right leg at that time. He he started having some shortness of breath. The shortness of breath had gradually improved. He still appeared to be having shortness of breath at times with just walking around the house. He was in a group home and did not get much time for exercise. An echocardiogram was done on 8/14/2018. The echo was essentially normal.  There was normal systolic function. There was no evidence of pulmonary hypertension. There was no significant valvular pathology. In the office today, a representative from his new group home is in attendance as well as a family member. There are no reports of the patient's shortness of breath. They report no particular problems. He is exercising and generally staying more active  in his new location. Patient Active Problem List    Diagnosis Date Noted    CERNA (dyspnea on exertion) 10/29/2018    DVT (deep venous thrombosis) ([facility] 10/29/2018    Seizure disorder ([facility] 07/18/2012    Hypothyroidism 07/18/2012     Current Outpatient Medications   Medication Sig Dispense Refill    carBAMazepine ER (CARBATROL ER) 300 mg capsule Take 300 mg by mouth two (2) times a day.  PARoxetine (PAXIL) 20 mg tablet Take  by mouth daily.  ARIPiprazole (ABILIFY) 5 mg tablet Take  by mouth daily.  rivaroxaban (XARELTO) 20 mg tab tablet Take  by mouth daily.  acetaminophen (TYLENOL) 325 mg tablet Take  by mouth every four (4) hours as needed for Pain.  sodium chloride (SALINE MIST) 0.65 % nasal squeeze bottle 1 Spray as needed for Congestion.  miconazole (MICOTIN) 2 % topical cream Apply  to affected area two (2) times a day.       zonisamide (ZONEGRAN) 100 mg capsule Take by mouth daily. 3 tabs daily through 11/30 then 4 tabs daily      DIVALPROEX SODIUM (DEPAKOTE PO) Take 1,000 mg PE by mouth three (3) times daily. 8am, 2pm, and 8pm      phenytoin ER (DILANTIN) 30 mg ER capsule Take 130 mg by mouth daily.  levothyroxine (LEVOXYL) 75 mcg tablet Take 75 mcg by mouth Daily (before breakfast).  Calcium-Cholecalciferol, D3, 500 mg(1,250mg) -400 unit tab Take 1 Tab by mouth daily.  multivitamin (ONE A DAY) tablet Take 1 Tab by mouth daily.  docusate sodium (COLACE) 100 mg capsule Take 100 mg by mouth two (2) times a day. Allergies   Allergen Reactions    Ampicillin Swelling    Amoxicillin Swelling    Cephalosporins Unable to Obtain    Penicillin G Swelling     Past Medical History:   Diagnosis Date    Endocrine disease     hypothyroidism    High cholesterol     Neurological disorder     cerebral palsy    Seizures ([address]      Past Surgical History:   Procedure Laterality Date    HX ORTHOPAEDIC      right heel      History reviewed. No pertinent family history. Social History     Tobacco Use   Smoking Status Never Smoker   Smokeless Tobacco Never Used          Review of Systems, additional:  Constitutional: negative  Eyes: negative  Respiratory: positive for dyspnea on exertion  Cardiovascular: positive for dyspnea on exertion  Gastrointestinal: negative  Musculoskeletal:negative  Neurological: negative  Behvioral/Psych: negative  Endocrine: negative  ENT: negative    Objective:     Visit Vitals  /75   Pulse 72   Wt 87.5 kg (193 lb)   SpO2 99%   BMI 29.35 kg/m²     General:  alert, cooperative, no distress   Chest Wall: inspection normal - no chest wall deformities or tenderness, respiratory effort normal   Lung: clear to auscultation bilaterally   Heart:  normal rate and regular rhythm, S1 and S2 normal, no murmurs noted, no gallops noted   Abdomen: soft, non-tender.  Bowel sounds normal. No masses,  no organomegaly   Extremities: extremities normal, atraumatic, no cyanosis . Right lower extremity has 1+ edema. Skin: no rashes   Neuro: alert, oriented, normal speech, no focal findings or movement disorder noted     EK2018; sinus rhythm. Nonspecific ST wave abnormality. Assessment/Plan:       ICD-10-CM ICD-9-CM    1. Seizure disorder ([facility] G40.909 345.90    2. Acute deep vein thrombosis (DVT) of femoral vein of right lower extremity ([facility] with no obvious increasing dyspnea on exertion. No known recurrence. Would continue Xarelto. I82.411 453.41    3. Hypothyroidism, unspecified type E03.9 244.9    4. Cerebral palsy,  he is in a new group home and much more physically active.

## 2022-03-18 PROBLEM — I82.409 DVT (DEEP VENOUS THROMBOSIS) (HCC): Status: ACTIVE | Noted: 2018-10-29

## 2022-03-19 PROBLEM — R06.09 DOE (DYSPNEA ON EXERTION): Status: ACTIVE | Noted: 2018-10-29

## 2023-07-14 ENCOUNTER — HOSPITAL ENCOUNTER (EMERGENCY)
Facility: HOSPITAL | Age: 53
Discharge: HOME | End: 2023-07-14
Attending: STUDENT IN AN ORGANIZED HEALTH CARE EDUCATION/TRAINING PROGRAM
Payer: MEDICARE

## 2023-07-14 ENCOUNTER — APPOINTMENT (OUTPATIENT)
Facility: HOSPITAL | Age: 53
End: 2023-07-14
Payer: MEDICARE

## 2023-07-14 VITALS
SYSTOLIC BLOOD PRESSURE: 120 MMHG | RESPIRATION RATE: 18 BRPM | TEMPERATURE: 97.8 F | HEIGHT: 69 IN | DIASTOLIC BLOOD PRESSURE: 63 MMHG | BODY MASS INDEX: 23.25 KG/M2 | WEIGHT: 157 LBS | OXYGEN SATURATION: 98 % | HEART RATE: 73 BPM

## 2023-07-14 DIAGNOSIS — S09.90XA INJURY OF HEAD, INITIAL ENCOUNTER: Primary | ICD-10-CM

## 2023-07-14 PROCEDURE — 99284 EMERGENCY DEPT VISIT MOD MDM: CPT

## 2023-07-14 PROCEDURE — 6370000000 HC RX 637 (ALT 250 FOR IP): Performed by: STUDENT IN AN ORGANIZED HEALTH CARE EDUCATION/TRAINING PROGRAM

## 2023-07-14 PROCEDURE — 70450 CT HEAD/BRAIN W/O DYE: CPT

## 2023-07-14 RX ORDER — ACETAMINOPHEN 500 MG
1000 TABLET ORAL
Status: COMPLETED | OUTPATIENT
Start: 2023-07-14 | End: 2023-07-14

## 2023-07-14 RX ADMIN — ACETAMINOPHEN 1000 MG: 500 TABLET ORAL at 11:11

## 2023-07-14 ASSESSMENT — PAIN - FUNCTIONAL ASSESSMENT: PAIN_FUNCTIONAL_ASSESSMENT: WONG-BAKER FACES

## 2023-07-14 ASSESSMENT — LIFESTYLE VARIABLES
HOW MANY STANDARD DRINKS CONTAINING ALCOHOL DO YOU HAVE ON A TYPICAL DAY: PATIENT DOES NOT DRINK
HOW OFTEN DO YOU HAVE A DRINK CONTAINING ALCOHOL: NEVER

## 2023-07-14 ASSESSMENT — PAIN SCALES - GENERAL
PAINLEVEL_OUTOF10: 0
PAINLEVEL_OUTOF10: 0

## 2023-07-14 NOTE — ED PROVIDER NOTES
acute intracranial process. Discussed signs and symptoms of infection. Discussed proper wound care of his abrasion to his ear. Discussed signs and symptoms of concussion and how to manage it. Patient stable for discharge at this time. Patient is in agreement with the plan to be discharged at this time. All the patient's questions were answered. Patient was given written instructions on the diagnosis, and states understanding of the plan moving forward. We did discuss important signs and symptoms that should prompt quick return to the emergency department. CRITICAL CARE TIME   Total Critical Care time was 0 minutes, excluding separately reportable procedures. There was a high probability of clinically significant/life threatening deterioration in the patient's condition which required my urgent intervention. CONSULTS:  None    PROCEDURES:  Unless otherwise noted below, none     Procedures      FINAL IMPRESSION      1. Injury of head, initial encounter          DISPOSITION/PLAN   DISPOSITION Decision To Discharge 07/14/2023 12:04:31 PM      PATIENT REFERRED TO:  Tyra Hernandez [address] [place] [address]  [phone]      As needed      DISCHARGE MEDICATIONS:  New Prescriptions    No medications on file     Controlled Substances Monitoring:     No flowsheet data found.     (Please note that portions of this note were completed with a voice recognition program.  Efforts were made to edit the dictations but occasionally words are mis-transcribed.)    Anita Warren MD (electronically signed)  Attending Emergency Physician            Anita Warren MD  07/14/23 [id number]

## 2023-07-14 NOTE — DISCHARGE INSTRUCTIONS
Recommend extra Tylenol every 4-6 hours and ibuprofen 600 mg every 6 hours as needed for pain or discomfort. Consider ice on for 10 minutes and off for 30 minutes to any area of discomfort.

## 2023-07-14 NOTE — ED NOTES
Pain assessment on discharge was 0/10. Condition stable. Patient education was completed. Patient education was taught to patient using discussion and handout. Patient verbalized understanding. Patient was discharged to home by self. Patient was discharged ambulatory.         Sushma Cooper RN  07/14/23 [id number]

## 2023-11-30 NOTE — DISCHARGE INSTRUCTIONS
[facility]                                  Skilled Nursing Facility                   Progress Note     Admit Date: 11/20/2023  TAMMIE 12/1/2023  [id number] A  Principal Problem:  Acute lower GI bleeding   HPI obtained from patient interview and chart review     Chief Complaint:Re-evaluation of medical treatment and therapy status: Lab review    HPI:   Mrs. Felipe is a 89-year-old female with PMHx of mild dementia, breast cancer, HTN, hypothyroidism, recurrent falls, HLD who presents to SNF following hospitalization for acute lower GI bleed.  Admission to SNF for secondary weakness and debility.    Interval history:  All of today's labs reviewed and are listed below.  Phos 2.5.  24 hr vital sign ranges reviewed and listed below.  Patient feels she was not constipated but having issues with hemorrhoids, preparation H ordered yesterday and patient reports improvement today.  Patient denies shortness of breath, abdominal discomfort, nausea, or vomiting.  Patient reports an adequate appetite.  Patient denies dysuria.  Patient reports having regular bowel movements.  Patient progressing with PT/OT-Gait: pt amb >300 ft SBA with no AD . Continuing to follow and treat all acute and chronic conditions.      Past Medical History: Patient has a past medical history of Hyperlipidemia, Hypothyroidism, Malignant neoplasm of upper-outer quadrant of right breast in female, estrogen receptor negative (1/7/2022), Osteoarthritis, knee, and Vertigo.    Past Surgical History: Patient has a past surgical history that includes dentures; left wrist surgery; Hysterectomy; Fracture surgery (Left); Mastectomy, partial (Right, 1/7/2022); Ballinger lymph node biopsy (Right, 1/7/2022); and Injection for sentinel node identification (Right, 1/7/2022).    Social History: Patient reports that she quit smoking about 34 years ago. Her smoking use included cigarettes. She  Epilepsy: Care Instructions  Your Care Instructions    Epilepsy is a common condition that causes repeated seizures. The seizures are caused by bursts of electrical activity in the brain that aren't normal. Seizures may cause problems with muscle control, movement, speech, vision, or awareness. They can be scary. Epilepsy affects each person differently. Some people have only a few seizures. Others get them more often. If you know what triggers a seizure, you may be able to avoid having one. You can take medicines to control and reduce seizures. You and your doctor will need to find the right combination, schedule, and dose of medicine. This may take time and careful changes. Seizures may get worse and happen more often over time. Follow-up care is a key part of your treatment and safety. Be sure to make and go to all appointments, and call your doctor if you are having problems. It's also a good idea to know your test results and keep a list of the medicines you take. How can you care for yourself at home? · Be safe with medicines. Take your medicines exactly as prescribed. Call your doctor if you think you are having a problem with your medicine. · Make a treatment plan with your doctor. Be sure to follow your plan. · Try to identify and avoid things that may make you more likely to have a seizure. These may include:  ¨ Not getting enough sleep. ¨ Using drugs or alcohol. ¨ Being emotionally stressed. ¨ Skipping meals. · Keep a record of any seizures you have. Note the date, time of day, and any details about the seizure that you can remember. Your doctor can use this information to plan or adjust your medicine or other treatment. · Be sure that any doctor treating you for another condition knows that you have epilepsy. Each doctor should know what medicines you are taking, if any. · Wear a medical ID bracelet. You can buy this at most [employer].  If you have a seizure that leaves you unconscious has never been exposed to tobacco smoke. She has never used smokeless tobacco. She reports current alcohol use of about 2.0 standard drinks of alcohol per week. She reports that she does not use drugs.    Family History: family history includes No Known Problems in her daughter, sister, and son.    Allergies: Patient has No Known Allergies.    ROS  Constitutional: Negative for fever   Eyes: Negative for blurred vision, double vision   Respiratory: Negative for cough, shortness of breath   Cardiovascular: Negative for chest pain, palpitations, and leg swelling.   Gastrointestinal: Negative for abdominal pain, diarrhea, nausea, vomiting.  +indigestion, constipation  Genitourinary: Negative for dysuria, frequency   Musculoskeletal:  + generalized weakness. Negative for back pain and myalgias.   Skin: Negative for itching and rash.   Neurological: Negative for dizziness, headaches.   Psychiatric/Behavioral: Negative for depression. The patient is not nervous/anxious.      24 hour Vital Sign Range   Temp:  [97.7 °F (36.5 °C)-98.2 °F (36.8 °C)]   Pulse:  [69-80]   Resp:  [18]   BP: (123-146)/(58-67)   SpO2:  [94 %-97 %]     Current BMI: Body mass index is 20.66 kg/m².    PEx  Constitutional: Patient appears debilitated.  No distress noted  HENT:   Head: Normocephalic and atraumatic.   Eyes: Pupils are equal, round  Neck: Normal range of motion. Neck supple.   Cardiovascular: Normal rate, regular rhythm and normal heart sounds.    Pulmonary/Chest: Effort normal and breath sounds are clear  Abdominal: Soft. Bowel sounds are normal.   Musculoskeletal: Normal range of motion.   Neurological: Alert and oriented to person, place.  Disoriented to time.  Impaired higher level thinking.  Psychiatric: Normal mood and affect. Behavior is normal.   Skin: Skin is warm and dry.     Recent Labs   Lab 11/30/23  0330      K 4.5      CO2 24   BUN 21   CREATININE 0.9   CALCIUM 8.9   MG 1.8       Recent Labs   Lab 11/30/23  0330  or unable to speak for yourself, this bracelet will let those who are treating you know that you have epilepsy. · Talk to your doctor about whether it is safe for you to do certain activities, such as drive or swim. When should you call for help? Call 911 anytime you think you may need emergency care. For example, call if:    · A seizure does not stop as it normally does.     · You have new symptoms such as:  ¨ Numbness, tingling, or weakness on one side of your body or face. ¨ Vision changes. ¨ Trouble speaking or thinking clearly.    Call your doctor now or seek immediate medical care if:    · You have a fever.     · You have a severe headache.    Watch closely for changes in your health, and be sure to contact your doctor if:    · The normal pattern or features of your seizures change. Where can you learn more? Go to http://marlonSolorein Technologydom.info/. Ryan Avery in the search box to learn more about \"Epilepsy: Care Instructions. \"  Current as of: October 9, 2017  Content Version: 11.7  © 8740-6930 [employer]. Care instructions adapted under license by [employer] (which disclaims liability or warranty for this information). If you have questions about a medical condition or this instruction, always ask your healthcare professional. [address] any warranty or liability for your use of this information. Hypothyroidism: Care Instructions  Your Care Instructions    You have hypothyroidism, which means that your body is not making enough thyroid hormone. This hormone helps your body use energy. If your thyroid level is low, you may feel tired, be constipated, have an increase in your blood pressure, or have dry skin or memory problems. You may also get cold easily, even when it is warm. Women with low thyroid levels may have heavy menstrual periods.   A blood test to find your thyroid-stimulating hormone (TSH) level is used to check for "  WBC 5.82   RBC 3.00*   HGB 9.3*   HCT 29.3*      MCV 98   MCH 31.0   MCHC 31.7*       No results for input(s): "POCTGLUCOSE" in the last 168 hours.     Assessment and Plan:    Lower GI bleed  - CTA abdomen pelvis showed active bleed and ascending colon, significant stool burden, diverticulosis  - GI consulted while inpatient  - S/p angiogram and coil embolization by IR on 11/16  - Ceftriaxone/Flagyl discontinued 11/17  - follow-up with GI after discharge from SNF    Acute blood loss anemia  - continue monitor twice weekly CBC, transfuse for hemoglobin < 7  - 11/22 worsening anemia, initiate order to transfuse 1 unit RBC, type and screen obtained, blood consent obtained from patient and phone conversation held with patient's daughter to provide care update regarding transfusion.  Patient's daughter also in agreement to blood transfusion.  - 11/28 H&H improving.  Ambulatory referral placed for GI for hospital follow-up    Hypophosphatemia  - initiated K-Phos 500 mg x 1 dose    Hemorrhoidal pain  - initiated preparation H TID x2 days, continue senna docusate 1 tab BID    Mild late onset Alzheimer's dementia without behavioral disturbance, psychotic disturbance, mood disturbance, or anxiety  - Patient with dementia with likely etiology of alzheimer's dementia. Dementia is mild. The patient does not have signs of behavioral disturbance.   Delirium precautions:  - Avoid antihistamines, anticholinergics, hypnotics, and minimize opiates while controlling for pain as these medications may exacerbate delirium. Cues for day/night will assist with keeping patient calm and oriented - during daytime, please keep adequate light in room (open windows, lights on) and please keep room dim at night-time to encourage normal sleep-wake cycles. Continuing to have nursing and family reorient the patient and encourage family to visit  - stable, continue home memantine 5 mg BID    Advance Care Planning  - completed while inpatient " hypothyroidism. A high TSH level may mean that you have low thyroid. When your body is not making enough thyroid hormone, TSH levels rise in an effort to make the body produce more. The treatment for hypothyroidism is to take thyroid hormone pills. You should start to feel better in 1 to 2 weeks. But it can take several months to see changes in the TSH level. You will need regular visits with your doctor to make sure you have the right dose of medicine. Most people need treatment for the rest of their lives. You will need to see your doctor regularly to have blood tests and to make sure you are doing well. Follow-up care is a key part of your treatment and safety. Be sure to make and go to all appointments, and call your doctor if you are having problems. It's also a good idea to know your test results and keep a list of the medicines you take. How can you care for yourself at home? · Take your thyroid hormone medicine exactly as prescribed. Call your doctor if you think you are having a problem with your medicine. Most people do not have side effects if they take the right amount of medicine regularly. ¨ Take the medicine 30 minutes before breakfast, and do not take it with calcium, vitamins, or iron. ¨ Do not take extra doses of your thyroid medicine. It will not help you get better any faster, and it may cause side effects. ¨ If you forget to take a dose, do NOT take a double dose of medicine. Take your usual dose the next day. · Tell your doctor about all prescription, herbal, or over-the-counter products you take. · Take care of yourself. Eat a healthy diet, get enough sleep, and get regular exercise. When should you call for help? Call 911 anytime you think you may need emergency care.  For example, call if:    · You passed out (lost consciousness).     · You have severe trouble breathing.     · You have a very slow heartbeat (less than 60 beats a minute).     · You have a low body temperature (95°F on 11/17, patient/family wishes to be DNR     History of breast cancer  - noted     Mood disorder  - stable, Continue home fluoxetine 20 mg daily     Hypothyroid  - stable, Continue levothyroxine 25 mcg daily     Aortic atherosclerosis  - Hold aspirin in setting of GI bleed   - Continue atorvastatin 10 mg qHS.    Seasonal allergies  - continue cetirizine 10 mg daily, will discontinue patient shows any signs of delirium    GERD  - stable, PRN Tums and Mylanta continue Protonix 40 mg daily    Debility   - Continue with PT/OT for gait training and strengthening and restoration of ADL's   - Encourage mobility, OOB in chair, and early ambulation as appropriate  - Fall precautions   - Monitor for bowel and bladder dysfunction  - Monitor for and prevent skin breakdown and pressure ulcers  - Continue DVT prophylaxis with frequent ambulation, chemical DVT PPX contraindicated due to recent GI bleed       Anticipate disposition:  Home with home health    IP [facility] RISK OF UNPLANNED READMISSION Model: MODERATE     Follow-up needed during SNF stay-    Appointment not to send patient to-Internal Medicine (12/1)    Follow-up needed after discharge from SNF: PCP, GI, needs to be scheduled    Future Appointments   Date Time Provider Department Center   12/1/2023 10:00 AM Gene Barnes MD [facility] MCK[facility]LA [facility]   1/11/2024  9:30 AM LAB, [facility] [place]   1/18/2024  3:00 PM Abiola Campbell MD [facility] Gaston Du NP  Department of Hospital Medicine   [facility]     DOS: 11/30/2023        Patient note was created using MModal Dictation.  Any errors in syntax or even information may not have been identified and edited on initial review prior to signing this note.    or below).    Call your doctor now or seek immediate medical care if:    · You feel tired, sluggish, or weak.     · You have trouble remembering things or concentrating.     · You do not begin to feel better 2 weeks after starting your medicine.    Watch closely for changes in your health, and be sure to contact your doctor if you have any problems. Where can you learn more? Go to http://marlon-dom.info/. Enter A296 in the search box to learn more about \"Hypothyroidism: Care Instructions. \"  Current as of: May 12, 2017  Content Version: 11.7  © 9878-1511 [email or web address]. Care instructions adapted under license by [employer] (which disclaims liability or warranty for this information). If you have questions about a medical condition or this instruction, always ask your healthcare professional. [address] any warranty or liability for your use of this information.

## 2025-07-23 ENCOUNTER — HOSPITAL ENCOUNTER (EMERGENCY)
Age: 55
Discharge: HOME | End: 2025-07-23
Attending: EMERGENCY MEDICINE
Payer: MEDICARE

## 2025-07-23 ENCOUNTER — APPOINTMENT (OUTPATIENT)
Age: 55
End: 2025-07-23
Payer: MEDICARE

## 2025-07-23 VITALS
SYSTOLIC BLOOD PRESSURE: 120 MMHG | OXYGEN SATURATION: 100 % | RESPIRATION RATE: 16 BRPM | TEMPERATURE: 97.1 F | WEIGHT: 199.6 LBS | HEART RATE: 71 BPM | BODY MASS INDEX: 29.56 KG/M2 | DIASTOLIC BLOOD PRESSURE: 60 MMHG | HEIGHT: 69 IN

## 2025-07-23 DIAGNOSIS — K59.00 CONSTIPATION, UNSPECIFIED CONSTIPATION TYPE: Primary | ICD-10-CM

## 2025-07-23 DIAGNOSIS — R10.84 GENERALIZED ABDOMINAL PAIN: ICD-10-CM

## 2025-07-23 LAB
BASOPHILS # BLD: 0.01 K/UL (ref 0–0.1)
BASOPHILS NFR BLD: 0.3 % (ref 0–2)
DIFFERENTIAL METHOD BLD: ABNORMAL
EKG ATRIAL RATE: 84 BPM
EKG DIAGNOSIS: NORMAL
EKG P AXIS: 45 DEGREES
EKG P-R INTERVAL: 154 MS
EKG Q-T INTERVAL: 334 MS
EKG QRS DURATION: 78 MS
EKG QTC CALCULATION (BAZETT): 394 MS
EKG R AXIS: 21 DEGREES
EKG T AXIS: 40 DEGREES
EKG VENTRICULAR RATE: 84 BPM
EOSINOPHIL # BLD: 0.03 K/UL (ref 0–0.4)
EOSINOPHIL NFR BLD: 0.8 % (ref 0–5)
ERYTHROCYTE [DISTWIDTH] IN BLOOD BY AUTOMATED COUNT: 13.1 % (ref 11.6–14.5)
HCT VFR BLD AUTO: 48 % (ref 36–48)
HGB BLD-MCNC: 15.6 G/DL (ref 13–16)
IMM GRANULOCYTES # BLD AUTO: 0.01 K/UL (ref 0–0.04)
IMM GRANULOCYTES NFR BLD AUTO: 0.3 % (ref 0–0.5)
LYMPHOCYTES # BLD: 2.39 K/UL (ref 0.9–3.6)
LYMPHOCYTES NFR BLD: 60.4 % (ref 21–52)
MCH RBC QN AUTO: 29.2 PG (ref 24–34)
MCHC RBC AUTO-ENTMCNC: 32.5 G/DL (ref 31–37)
MCV RBC AUTO: 89.9 FL (ref 78–100)
MONOCYTES # BLD: 0.27 K/UL (ref 0.05–1.2)
MONOCYTES NFR BLD: 6.8 % (ref 3–10)
NEUTS SEG # BLD: 1.25 K/UL (ref 1.8–8)
NEUTS SEG NFR BLD: 31.4 % (ref 40–73)
NRBC # BLD: 0 K/UL (ref 0–0.01)
NRBC BLD-RTO: 0 PER 100 WBC
PLATELET # BLD AUTO: 113 K/UL (ref 135–420)
PMV BLD AUTO: 11.6 FL (ref 9.2–11.8)
RBC # BLD AUTO: 5.34 M/UL (ref 4.35–5.65)
WBC # BLD AUTO: 4 K/UL (ref 4.6–13.2)

## 2025-07-23 PROCEDURE — 74176 CT ABD & PELVIS W/O CONTRAST: CPT

## 2025-07-23 PROCEDURE — 85025 COMPLETE CBC W/AUTO DIFF WBC: CPT

## 2025-07-23 PROCEDURE — 93005 ELECTROCARDIOGRAM TRACING: CPT

## 2025-07-23 PROCEDURE — 99284 EMERGENCY DEPT VISIT MOD MDM: CPT

## 2025-07-23 ASSESSMENT — PAIN - FUNCTIONAL ASSESSMENT: PAIN_FUNCTIONAL_ASSESSMENT: WONG-BAKER FACES

## 2025-07-23 ASSESSMENT — PAIN SCALES - WONG BAKER: WONGBAKER_NUMERICALRESPONSE: NO HURT

## 2025-07-23 NOTE — ED NOTES
Person at bedside is from facility and is answering questions best to his knowledge.    Patient did come with paperwork from facility that list only one medication that is active. This paperwork also states patient has cerebral palsy with intellectual disability, which is why this nurse is asking person at bedside to help with questions.    This person did state that the patient should be able to indicate that he has to use the bathroom for this nurse to collect.

## 2025-07-23 NOTE — ED PROVIDER NOTES
radiographic images are visualized and preliminarily interpreted by the emergency physician with the below findings:        Interpretation per the Radiologist below, if available at the time of this note:    CT ABDOMEN PELVIS WO CONTRAST Additional Contrast? None   Final Result   1.  NO acute findings in the abdomen and pelvis.   2.  Mild to moderate colonic stool burden-may represent constipation.   3.  Bilateral lung diffuse groundglass opacities-nonspecific; atypical/viral   pneumonia not excluded. LEFT lung lower base minimal consolidation versus   atelectasis.   4.  LEFT lung basilar 9 mm juxtapleural nodule-indeterminate, may be nodular   atelectasis; however neoplasm not excluded. Consider CT in 3 months according to   Fleischner guideline.            Electronically signed by Darcy Diaz            ED BEDSIDE ULTRASOUND:   Performed by ED Physician - none    LABS:  Recent Results (from the past 24 hours)   EKG 12 Lead    Collection Time: 07/23/25  2:11 PM   Result Value Ref Range    Ventricular Rate 84 BPM    Atrial Rate 84 BPM    P-R Interval 154 ms    QRS Duration 78 ms    Q-T Interval 334 ms    QTc Calculation (Bazett) 394 ms    P Axis 45 degrees    R Axis 21 degrees    T Axis 40 degrees    Diagnosis       Normal sinus rhythm  Normal ECG  When compared with ECG of 29-AUG-2018 14:20,  No significant change was found  Confirmed by MD Praveen, Jonn ([id number]) on 7/23/2025 2:58:53 PM     CBC with Diff    Collection Time: 07/23/25  2:14 PM   Result Value Ref Range    WBC 4.0 (L) 4.6 - 13.2 K/uL    RBC 5.34 4.35 - 5.65 M/uL    Hemoglobin 15.6 13.0 - 16.0 g/dL    Hematocrit 48.0 36.0 - 48.0 %    MCV 89.9 78.0 - 100.0 FL    MCH 29.2 24.0 - 34.0 PG    MCHC 32.5 31.0 - 37.0 g/dL    RDW 13.1 11.6 - 14.5 %    Platelets 113 (L) 135 - 420 K/uL    MPV 11.6 9.2 - 11.8 FL    Nucleated RBCs 0.0 0  WBC    nRBC 0.00 0.00 - 0.01 K/uL    Neutrophils % 31.4 (L) 40.0 - 73.0 %    Lymphocytes % 60.4 (H) 21.0 - 52.0 %

## 2025-07-23 NOTE — ED NOTES
[facility] ED Patient Discharge      Pain assessment on discharge:   Discharge Destination Home  Discharge VIA wheelchair  Condition: Improved  Patient educated was completed: No, patient's guardians were educated  Teaching method used was discussed.   Understanding of teaching was: Good  Valuables and Belongings given to: guardian    Patient received narcotic pain medication? no  Patient received education on narcotic pain medication? No  Patient has transportation home at bedside? Yes

## 2025-07-23 NOTE — ED TRIAGE NOTES
Patient presents to ER via stretcher with [facility] after being at [facility] appeared to breakout in cold sweats and look like his abd was hurting.  Patient lives in a group home otherwise. Guardian contact and was able to complete Triage with worker Isa Rodgers and patients Mother Su Horan @[phone].

## 2025-07-23 NOTE — ED NOTES
Patient's mother and father at bedside.    Patient's mother helped this nurse with cleaning patient after having a bowel movement. Patient was placed in a diaper and provided a warm blanket. Patients  pants left off.     Patient was unable to provide a urine sample at this time. Patient's mother and father are aware that the patient needs to provide a urine sample. This nurse asked the patient's mother and father to call for nurse when patient indicated that he is able to provide a urine sample.